# Patient Record
Sex: MALE | Race: WHITE | NOT HISPANIC OR LATINO | Employment: OTHER | URBAN - METROPOLITAN AREA
[De-identification: names, ages, dates, MRNs, and addresses within clinical notes are randomized per-mention and may not be internally consistent; named-entity substitution may affect disease eponyms.]

---

## 2019-08-01 ENCOUNTER — HOSPITAL ENCOUNTER (OUTPATIENT)
Dept: OTHER | Facility: HOSPITAL | Age: 64
Discharge: HOME | End: 2019-08-01
Attending: SPECIALIST

## 2019-08-01 LAB
ALBUMIN SERPL-MCNC: 4.3 G/DL (ref 3.5–5)
ALBUMIN/GLOB SERPL: 1.4 {RATIO} (ref 1.4–2.6)
ALP SERPL-CCNC: 87 U/L (ref 56–155)
ALT SERPL-CCNC: 17 U/L (ref 10–40)
ANION GAP SERPL CALC-SCNC: 17 MMOL/L (ref 8–19)
AST SERPL-CCNC: 17 U/L (ref 15–50)
BILIRUB SERPL-MCNC: 0.87 MG/DL (ref 0.2–1.3)
BUN SERPL-MCNC: 13 MG/DL (ref 5–25)
BUN/CREAT SERPL: 15 {RATIO} (ref 6–20)
CALCIUM SERPL-MCNC: 9.7 MG/DL (ref 8.7–10.4)
CHLORIDE SERPL-SCNC: 100 MMOL/L (ref 99–111)
CHOLEST SERPL-MCNC: 107 MG/DL (ref 107–200)
CHOLEST/HDLC SERPL: 3.1 {RATIO} (ref 3–6)
CONV CO2: 27 MMOL/L (ref 22–32)
CONV TOTAL PROTEIN: 7.3 G/DL (ref 6.3–8.2)
CREAT UR-MCNC: 0.89 MG/DL (ref 0.7–1.2)
GFR SERPLBLD BASED ON 1.73 SQ M-ARVRAT: >60 ML/MIN/{1.73_M2}
GLOBULIN UR ELPH-MCNC: 3 G/DL (ref 2–3.5)
GLUCOSE SERPL-MCNC: 86 MG/DL (ref 70–99)
HDLC SERPL-MCNC: 35 MG/DL (ref 40–60)
LDLC SERPL CALC-MCNC: 53 MG/DL (ref 70–100)
OSMOLALITY SERPL CALC.SUM OF ELEC: 289 MOSM/KG (ref 273–304)
POTASSIUM SERPL-SCNC: 4 MMOL/L (ref 3.5–5.3)
PSA SERPL-MCNC: 3.84 NG/ML (ref 0–4)
SODIUM SERPL-SCNC: 140 MMOL/L (ref 135–147)
TRIGL SERPL-MCNC: 97 MG/DL (ref 40–150)
VLDLC SERPL-MCNC: 19 MG/DL (ref 5–37)

## 2020-07-30 ENCOUNTER — HOSPITAL ENCOUNTER (OUTPATIENT)
Dept: OTHER | Facility: HOSPITAL | Age: 65
Discharge: HOME | End: 2020-07-30
Attending: SPECIALIST

## 2020-07-30 LAB
ALBUMIN SERPL-MCNC: 4.1 G/DL (ref 3.5–5)
ALBUMIN/GLOB SERPL: 1.3 {RATIO} (ref 1.4–2.6)
ALP SERPL-CCNC: 82 U/L (ref 56–155)
ALT SERPL-CCNC: 16 U/L (ref 10–40)
ANION GAP SERPL CALC-SCNC: 20 MMOL/L (ref 8–19)
AST SERPL-CCNC: 17 U/L (ref 15–50)
BILIRUB SERPL-MCNC: 0.72 MG/DL (ref 0.2–1.3)
BUN SERPL-MCNC: 9 MG/DL (ref 5–25)
BUN/CREAT SERPL: 10 {RATIO} (ref 6–20)
CALCIUM SERPL-MCNC: 9.9 MG/DL (ref 8.7–10.4)
CHLORIDE SERPL-SCNC: 102 MMOL/L (ref 99–111)
CHOLEST SERPL-MCNC: 118 MG/DL (ref 107–200)
CHOLEST/HDLC SERPL: 3.8 {RATIO} (ref 3–6)
CONV CO2: 25 MMOL/L (ref 22–32)
CONV TOTAL PROTEIN: 7.2 G/DL (ref 6.3–8.2)
CREAT UR-MCNC: 0.91 MG/DL (ref 0.7–1.2)
GFR SERPLBLD BASED ON 1.73 SQ M-ARVRAT: >60 ML/MIN/{1.73_M2}
GLOBULIN UR ELPH-MCNC: 3.1 G/DL (ref 2–3.5)
GLUCOSE SERPL-MCNC: 105 MG/DL (ref 70–99)
HDLC SERPL-MCNC: 31 MG/DL (ref 40–60)
LDLC SERPL CALC-MCNC: 59 MG/DL (ref 70–100)
OSMOLALITY SERPL CALC.SUM OF ELEC: 295 MOSM/KG (ref 273–304)
POTASSIUM SERPL-SCNC: 3.5 MMOL/L (ref 3.5–5.3)
PSA SERPL-MCNC: 5.43 NG/ML (ref 0–4)
SODIUM SERPL-SCNC: 143 MMOL/L (ref 135–147)
TRIGL SERPL-MCNC: 140 MG/DL (ref 40–150)
VLDLC SERPL-MCNC: 28 MG/DL (ref 5–37)

## 2020-11-12 ENCOUNTER — OFFICE VISIT CONVERTED (OUTPATIENT)
Dept: UROLOGY | Facility: CLINIC | Age: 65
End: 2020-11-12
Attending: NURSE PRACTITIONER

## 2020-11-17 ENCOUNTER — HOSPITAL ENCOUNTER (OUTPATIENT)
Dept: LAB | Facility: HOSPITAL | Age: 65
Discharge: HOME | End: 2020-11-17
Attending: PHYSICIAN ASSISTANT

## 2020-11-17 LAB
25(OH)D3 SERPL-MCNC: 23.3 NG/ML (ref 30–100)
ALBUMIN SERPL-MCNC: 4.2 G/DL (ref 3.5–5)
ALBUMIN/GLOB SERPL: 1.4 {RATIO} (ref 1.4–2.6)
ALP SERPL-CCNC: 91 U/L (ref 56–155)
ALT SERPL-CCNC: 17 U/L (ref 10–40)
ANION GAP SERPL CALC-SCNC: 16 MMOL/L (ref 8–19)
AST SERPL-CCNC: 20 U/L (ref 15–50)
BASOPHILS # BLD AUTO: 0.07 10*3/UL (ref 0–0.2)
BASOPHILS NFR BLD AUTO: 0.9 % (ref 0–3)
BILIRUB SERPL-MCNC: 0.61 MG/DL (ref 0.2–1.3)
BUN SERPL-MCNC: 10 MG/DL (ref 5–25)
BUN/CREAT SERPL: 11 {RATIO} (ref 6–20)
CALCIUM SERPL-MCNC: 9.4 MG/DL (ref 8.7–10.4)
CHLORIDE SERPL-SCNC: 103 MMOL/L (ref 99–111)
CHOLEST SERPL-MCNC: 110 MG/DL (ref 107–200)
CHOLEST/HDLC SERPL: 2.9 {RATIO} (ref 3–6)
CONV ABS IMM GRAN: 0.02 10*3/UL (ref 0–0.2)
CONV CO2: 27 MMOL/L (ref 22–32)
CONV IMMATURE GRAN: 0.3 % (ref 0–1.8)
CONV TOTAL PROTEIN: 7.2 G/DL (ref 6.3–8.2)
CREAT UR-MCNC: 0.95 MG/DL (ref 0.7–1.2)
DEPRECATED RDW RBC AUTO: 38.7 FL (ref 35.1–43.9)
EOSINOPHIL # BLD AUTO: 0.32 10*3/UL (ref 0–0.7)
EOSINOPHIL # BLD AUTO: 4 % (ref 0–7)
ERYTHROCYTE [DISTWIDTH] IN BLOOD BY AUTOMATED COUNT: 12.1 % (ref 11.6–14.4)
EST. AVERAGE GLUCOSE BLD GHB EST-MCNC: 126 MG/DL
GFR SERPLBLD BASED ON 1.73 SQ M-ARVRAT: >60 ML/MIN/{1.73_M2}
GLOBULIN UR ELPH-MCNC: 3 G/DL (ref 2–3.5)
GLUCOSE SERPL-MCNC: 96 MG/DL (ref 70–99)
HBA1C MFR BLD: 6 % (ref 3.5–5.7)
HCT VFR BLD AUTO: 44.3 % (ref 42–52)
HDLC SERPL-MCNC: 38 MG/DL (ref 40–60)
HGB BLD-MCNC: 15.2 G/DL (ref 14–18)
LDLC SERPL CALC-MCNC: 58 MG/DL (ref 70–100)
LYMPHOCYTES # BLD AUTO: 2.93 10*3/UL (ref 1–5)
LYMPHOCYTES NFR BLD AUTO: 36.6 % (ref 20–45)
MCH RBC QN AUTO: 29.7 PG (ref 27–31)
MCHC RBC AUTO-ENTMCNC: 34.3 G/DL (ref 33–37)
MCV RBC AUTO: 86.7 FL (ref 80–96)
MONOCYTES # BLD AUTO: 0.51 10*3/UL (ref 0.2–1.2)
MONOCYTES NFR BLD AUTO: 6.4 % (ref 3–10)
NEUTROPHILS # BLD AUTO: 4.15 10*3/UL (ref 2–8)
NEUTROPHILS NFR BLD AUTO: 51.8 % (ref 30–85)
NRBC CBCN: 0 % (ref 0–0.7)
OSMOLALITY SERPL CALC.SUM OF ELEC: 293 MOSM/KG (ref 273–304)
PLATELET # BLD AUTO: 247 10*3/UL (ref 130–400)
PMV BLD AUTO: 11 FL (ref 9.4–12.4)
POTASSIUM SERPL-SCNC: 4.3 MMOL/L (ref 3.5–5.3)
RBC # BLD AUTO: 5.11 10*6/UL (ref 4.7–6.1)
SODIUM SERPL-SCNC: 142 MMOL/L (ref 135–147)
T4 FREE SERPL-MCNC: 1.2 NG/DL (ref 0.9–1.8)
TRIGL SERPL-MCNC: 72 MG/DL (ref 40–150)
TSH SERPL-ACNC: 2.48 M[IU]/L (ref 0.27–4.2)
VLDLC SERPL-MCNC: 14 MG/DL (ref 5–37)
WBC # BLD AUTO: 8 10*3/UL (ref 4.8–10.8)

## 2021-01-29 ENCOUNTER — HOSPITAL ENCOUNTER (OUTPATIENT)
Dept: OTHER | Facility: HOSPITAL | Age: 66
Discharge: HOME | End: 2021-01-29
Attending: NURSE PRACTITIONER

## 2021-01-29 LAB — PSA SERPL-MCNC: 4.61 NG/ML (ref 0–4)

## 2021-02-26 ENCOUNTER — CONVERSION ENCOUNTER (OUTPATIENT)
Dept: SURGERY | Facility: CLINIC | Age: 66
End: 2021-02-26

## 2021-02-26 ENCOUNTER — OFFICE VISIT CONVERTED (OUTPATIENT)
Dept: UROLOGY | Facility: CLINIC | Age: 66
End: 2021-02-26
Attending: NURSE PRACTITIONER

## 2021-02-26 LAB
BILIRUB UR QL STRIP: NEGATIVE
COLOR UR: YELLOW
CONV BACTERIA IN URINE MICRO: 0
CONV CALCIUM OXALATE CRYSTALS /HPF IN URINE SEDIMENT BY MICROSCOPY: 0
CONV CLARITY OF URINE: CLEAR
CONV PROTEIN IN URINE BY AUTOMATED TEST STRIP: NEGATIVE
CONV UROBILINOGEN IN URINE BY AUTOMATED TEST STRIP: NORMAL
GLUCOSE UR QL: NEGATIVE
HGB UR QL STRIP: NEGATIVE
KETONES UR QL STRIP: NEGATIVE
LEUKOCYTE ESTERASE UR QL STRIP: NORMAL
NITRITE UR QL STRIP: NEGATIVE
PH UR STRIP.AUTO: 7 [PH]
RBC #/AREA URNS HPF: 0 /[HPF]
RENAL EPI CELLS #/AREA URNS HPF: 0 /[HPF]
SP GR UR: 1.02
SQUAMOUS SPT QL MICRO: 0
WBC #/AREA URNS HPF: 0 /[HPF]

## 2021-03-02 ENCOUNTER — OFFICE VISIT CONVERTED (OUTPATIENT)
Dept: SURGERY | Facility: CLINIC | Age: 66
End: 2021-03-02
Attending: NURSE PRACTITIONER

## 2021-03-02 ENCOUNTER — CONVERSION ENCOUNTER (OUTPATIENT)
Dept: SURGERY | Facility: CLINIC | Age: 66
End: 2021-03-02

## 2021-03-10 ENCOUNTER — HOSPITAL ENCOUNTER (OUTPATIENT)
Dept: VACCINE CLINIC | Facility: HOSPITAL | Age: 66
Discharge: HOME | End: 2021-03-10
Attending: INTERNAL MEDICINE

## 2021-03-31 ENCOUNTER — HOSPITAL ENCOUNTER (OUTPATIENT)
Dept: VACCINE CLINIC | Facility: HOSPITAL | Age: 66
Discharge: HOME | End: 2021-03-31
Attending: INTERNAL MEDICINE

## 2021-04-01 ENCOUNTER — HOSPITAL ENCOUNTER (OUTPATIENT)
Dept: GASTROENTEROLOGY | Facility: HOSPITAL | Age: 66
Setting detail: HOSPITAL OUTPATIENT SURGERY
Discharge: HOME | End: 2021-04-01
Attending: SURGERY

## 2021-04-15 ENCOUNTER — OFFICE VISIT CONVERTED (OUTPATIENT)
Dept: SURGERY | Facility: CLINIC | Age: 66
End: 2021-04-15
Attending: SURGERY

## 2021-05-10 NOTE — H&P
History and Physical      Patient Name: David Scott   Patient ID: [id number]   Sex: Male   YOB: 1955    Referring Provider: Neo Kat MD    Visit Date: November 12, 2020    Provider: NORA Mares   Location: [facility]   Location Address: [address] [place] [address]  [id number]   Location Phone: [phone]          Chief Complaint  · Elevated PSA      History Of Present Illness  The patient is a 65 year old /White male, who presents on referral from Neo Kat MD, for an urological evaluation for an elevated PSA. The PSA was noted as elevated approximately 3 months ago and stated to be at a level of 5.4 . There has not been a repeat PSA since the last elevated one on 07/30/2020   The patient also reports frequency, hesitancy, slowing of his stream, nocturia, 1-2 times a night, and post void dribbling. Additionally, he denies burning, chills, fever, and hematuria.   The patient is currently not taking any Urology specific medications.   The patient's past medical history is non-contributory.   Petinent studies:  To date, no diagnostic studies have been performed. He was evaluated by Garcia Fox MD 3 years ago for the condition of elevated PSA.      Was previously seen in 2017 for a mildly elevated PSA level that had gone down by the time he had presented to the urology provider.  He was recommended to have annual follow-ups with urology however, has not been seen since his last visit in 2017.    1 brother with prostates cancer    No other family Hx of  cancers.    PSA trend:   7/20     5.43  8/19     3.84  6/18     3.78  10/16   3.43  3/16     4.52  10/14    2.7           Past Medical History  Disease Name Date Onset Notes   Allergic rhinitis, chronic --  --    High blood pressure --  --    Personal history of colonic polyps 01/15/2016 --    Screening for colon cancer 01/15/2016 --          Past Surgical History  Procedure Name Date Notes    Colonoscopy --  --          Medication List  Name Date Started Instructions   Eliquis 5 mg oral tablet 01/07/2016 --    losartan-hydrochlorothiazide 100-12.5 mg oral tablet  take 1 tablet by oral route once daily   metoprolol tartrate 50 mg oral tablet 12/22/2015 --    pravastatin 10 mg oral tablet  take 1 tablet (10 mg) by oral route once daily at bedtime         Allergy List  Allergen Name Date Reaction Notes   NO KNOWN DRUG ALLERGIES --  --  --          Family Medical History  Disease Name Relative/Age Notes   Renal Calculus Brother/   Brother         Social History  Finding Status Start/Stop Quantity Notes   Alcohol Never 0/0 --  drinks no   Caffeine Current every day 0/0 --  drinks regularly; coffee, tea and soft drinks; 3-4 times per day   Second hand smoke exposure Never 0/0 --  no   Tobacco Never 0/0 --  never a smoker         Review of Systems  · Constitutional  o Denies  o : fever, chills  · HENT  o Denies  o : sore throat, nasal congestion, nasal discharge, sinus pain, headaches  · Cardiovascular  o Denies  o : chest pain, cardiac murmurs, irregular heart beats, dyspnea on exertion  · Respiratory  o Denies  o : shortness of breath, wheezing  · Gastrointestinal  o Denies  o : nausea, vomiting, change in abdominal girth, diarrhea, constipation, blood in stools  · Genitourinary  o Denies  o : additional symptoms, except as noted in HPI  · Integument  o Denies  o : rash, itching, new skin lesions  · Neurologic  o Denies  o : tingling or numbness, incoordination, seizures  · Endocrine  o Denies  o : polyuria, polydipsia, cold intolerance, heat intolerance, weight gain, weight loss  · Psychiatric  o Denies  o : anxiety, depression      Vitals  Date Time BP Position Site L\R Cuff Size HR RR TEMP (F) WT  HT  BMI kg/m2 BSA m2 O2 Sat FR L/min FiO2 HC       11/12/2020 11:18 AM       15  261lbs 4oz 6'   35.43 2.45             Physical Examination  · Constitutional  o Appearance  o : Well nourished, well developed  patient in no acute distress. Ambulating without difficulty.  · Head and Face  o Head  o :   § Inspection  § : atraumatic, normocephalic  o Face  o :   § Inspection  § : no facial lesions  · Eyes  o Sclerae  o : sclerae white  · Ears, Nose, Mouth and Throat  o Ears  o :   § External Ears  § : appearance within normal limits, no lesions present  o Nose  o :   § External Nose  § : appearance normal  · Neck  o Inspection/Palpation  o : normal appearance, trachea midline  o Thyroid  o : Normal size without tenderness, nodules or masses  · Respiratory  o Respiratory Effort  o : Breathing is unlabored without accessory muscle use  o Inspection of Chest  o : normal appearance, no retractions  o Auscultation of Lungs  o : Normal breath sounds  · Genitourinary  o Penis  o : Normal appearance without lesion, discharge or masses.  o Digital Rectal Examination  o :   § Tone and Masses  § : normal sphincter tone, no rectal masses present  § Prostate  § : nontender to palpation, mildly enlarged, no nodules present, consistency normal  § Seminal Vesicles  § : normal size and symmetry without masses or tenderness  · Skin and Subcutaneous Tissue  o General Inspection  o : No rashes, lesions or areas of discoloration present. Skin turgor is normal.  o General Palpation  o : No abnormalities, masses or tenderness on palpation.  · Neurologic  o Mental Status Examination  o :   § Orientation  § : grossly oriented to person, place and time  § Speech/Language  § : communication ability within normal limits  o Gait and Station  o : normal gait, able to stand without difficulty  · Psychiatric  o Judgement and Insight  o : judgment and insight intact, judgement for everyday activities and social situations within normal limits, insight intact  o Mood and Affect  o : mood normal, affect appropriate          Results  · In-Office Procedures  o Lab procedure  § Automated dipstick urinalysis with microscopy (07293)   § Color Ur: Yellow    § Clarity Ur: Clear   § Glucose Ur Ql Strip: Negative   § Bilirub Ur Ql Strip: Negative   § Ketones Ur Ql Strip: Negative   § Sp Gr Ur Qn: 1.025   § Hgb Ur Ql Strip: Negative   § pH Ur-LsCnc: 7.0   § Prot Ur Ql Strip: Negative   § Urobilinogen Ur Strip-mCnc: 0.2   § Nitrite Ur Ql Strip: Negative   § WBC Est Ur Ql Strip: Negative   § RBC UrnS Qn HPF: 00   § WBC UrnS Qn HPF: 0   § Bacteria UrnS Qn HPF: 0   § Crystals UrnS Qn HPF: 0   § Epithelial Cells (non renal): 0 /HPF  § Epithelial Cells (renal): 0   o Surgical procedure  § IOP - Bladder Scan/Residual Urine (25138)   § Specimen vol Ur: 12       Assessment  · Elevated prostate specific antigen (PSA)     790.93/R97.20  · Lower urinary tract symptoms     788.99/R39.9      Plan  · Orders  o PSA ultrasensitive DIAGNOSTIC [facility] (25298) - 790.93/R97.20, 788.99/R39.9 - 02/12/2021  · Medications  o Florajen3 460 mg (7.5-6- 1.5 bill. cell) oral capsule   SIG: take 1 capsule by oral route 2 times a day for 28 days   DISP: (56) Capsule with 0 refills  Prescribed on 11/12/2020     o doxycycline hyclate 100 mg oral capsule   SIG: take 1 capsule (100 mg) by oral route 2 times per day for 28 days   DISP: (56) Capsule with 0 refills  Prescribed on 11/12/2020     o tamsulosin 0.4 mg oral capsule   SIG: take 1 capsule (0.4 mg) by oral route once daily 1/2 hour following the same meal each day for 30 days   DISP: (30) Capsule with 11 refills  Prescribed on 11/12/2020     · Instructions  o DISCUSSION AND PLAN:  o The patient's PSA is elevated on initial exam. I have discussed the causes of an elevated PSA. I have made recommendations and I have the patient return in follow-up for a recheck of the PSA.  o Will treat for any infectious cause of elevated PSA and Repeat PSA in 3 months             Electronically Signed by: NORA Mares -Author on November 12, 2020 12:04:13 PM

## 2021-05-10 NOTE — H&P
History and Physical      Patient Name: David Scott   Patient ID: [id number]   Sex: Male   YOB: 1955    Primary Care Provider: Ceci Costa PA-C   Referring Provider: Neo Kat MD    Visit Date: March 2, 2021    Provider: NORA Giraldo   Location: [facility]   Location Address: [address] [place] [address]  [id number]   Location Phone: [phone]          Chief Complaint  · Requesting colonoscopy  · Age 50 or over  · Here today for a pre-surgical colon screening visit  · Personal History of Polyps      History Of Present Illness  The patient is a 65 year old /White male presenting to the Surgical Specialist office on a referral from Neo Kat MD.   David Scott needs to have a screening colonoscopy.   Patient states that they have had a colonoscopy. 5 years ago   Patient currently complains of: no complaints   Patient Does not have family history of colon cancer.      Presents today on referral from Dr. Neo Kat for screening colonoscopy.  Patient denies any abdominal pain.  Change in bowel habit, or rectal bleeding.  Denies any family history of colorectal cancer.  Admits to history of colonic polyps.      Patient reports that he takes Eliquis daily for A. fib and is under the care of Dr. Kat.  I will get cardiac clearance prior to the procedure.    2/16: Colonoscopy ([facility]): Hepatic Flexure - tubular adenoma; Cecum polyp not retrieved.       Past Medical History  Disease Name Date Onset Notes   Allergic rhinitis, chronic --  --    High blood pressure --  --    Personal history of colonic polyps 01/15/2016 --    Screening for colon cancer 01/15/2016 --          Past Surgical History  Procedure Name Date Notes   Colonoscopy --  --          Medication List  Name Date Started Instructions   Eliquis 5 mg oral tablet 01/07/2016 --    losartan-hydrochlorothiazide 100-12.5 mg oral tablet  take 1 tablet by oral route once daily   metoprolol  tartrate 50 mg oral tablet 12/22/2015 --    pravastatin 10 mg oral tablet  take 1 tablet (10 mg) by oral route once daily at bedtime   Suprep Bowel Prep Kit 17.5-3.13-1.6 gram oral recon soln 03/02/2021 take as directed   tamsulosin 0.4 mg oral capsule 11/12/2020 take 1 capsule (0.4 mg) by oral route once daily 1/2 hour following the same meal each day for 30 days         Allergy List  Allergen Name Date Reaction Notes   NO KNOWN DRUG ALLERGIES --  --  --        Allergies Reconciled  Family Medical History  Disease Name Relative/Age Notes   Prostate cancer Brother/   Brother   Renal Calculus Brother/   Brother         Social History  Finding Status Start/Stop Quantity Notes   Alcohol Current some day --/-- --  Light drinker   Caffeine Current every day 0/0 --  drinks regularly; coffee, tea and soft drinks; 3-4 times per day   Second hand smoke exposure Never 0/0 --  no   Tobacco Never 0/0 --  never a smoker         Review of Systems  · Constitutional  o Denies  o : fever, chills  · Eyes  o Denies  o : yellowish discoloration of eyes  · HENT  o Denies  o : difficulty swallowing  · Cardiovascular  o Denies  o : chest pain, chest pain on exertion  · Respiratory  o Denies  o : shortness of breath  · Gastrointestinal  o Denies  o : nausea, vomiting, diarrhea, constipation  · Genitourinary  o Denies  o : abnormal color of urine  · Integument  o Denies  o : rash  · Neurologic  o Denies  o : tingling or numbness  · Musculoskeletal  o Denies  o : joint pain  · Endocrine  o Denies  o : weight gain, weight loss      Vitals  Date Time BP Position Site L\R Cuff Size HR RR TEMP (F) WT  HT  BMI kg/m2 BSA m2 O2 Sat FR L/min FiO2 HC       03/02/2021 09:09 AM       16  267lbs 0oz 6'   36.21 2.48             Physical Examination  · Constitutional  o Appearance  o : well developed, well-nourished, patient in no apparent distress  · Head and Face  o Head  o :   § Inspection  § : atraumatic, normocephalic  o Face  o :   § Inspection  § :  no facial lesions  · Eyes  o Conjunctivae  o : conjunctivae normal  o Sclerae  o : sclerae white  · Neck  o Inspection/Palpation  o : normal appearance, no masses or tenderness, trachea midline  · Respiratory  o Respiratory Effort  o : breathing unlabored  · Skin and Subcutaneous Tissue  o General Inspection  o : no lesions present, no areas of discoloration, skin turgor normal, texture normal  · Neurologic  o Mental Status Examination  o :   § Orientation  § : grossly oriented to person, place and time  § Attention  § : attention normal, concentration abilities normal  § Fund of Knowledge  § : fund of knowledge within normal limits, patient aware of current events  o Gait and Station  o : normal gait, able to stand without difficulty  · Psychiatric  o Judgement and Insight  o : judgment and insight intact  o Mood and Affect  o : mood normal, affect appropriate              Assessment  · Personal history of colonic polyps     V12.72/Z86.010  · Screening for colon cancer     V76.51/Z12.11    Problems Reconciled  Plan  · Orders  o Consent for Colonoscopy Screening-Possible risk/complications, benefits, and alternatives to surgical or invasive procedure have been explained to patient and/or legal guardian. -Patient has been evaluated and can tolerate anesthesia and/or sedation. Risks, benefits, and alternatives to anesthesia and sedation have been explained to patient and/or legal guardian. () - V76.51/Z12.11, V12.72/Z86.010 - 04/01/2021  · Medications  o Medications have been Reconciled  o Transition of Care or Provider Policy  · Instructions  o Surgical [facility]  o Handouts Provided Pre-Procedure Instructions including date, time, and location of procedure.   o PLAN: Proceeed with colonoscopy. Patient understands risks/benefits and is willing to proceed.   o ***Surgical Orders***  o RISK AND BENEFITS:  o Given these options, the patient has verbally expressed an understanding of the  risks of the surgery and finds these risks acceptable. Will proceed with surgery as soon as possible.  o O.R. PREP: Per protocol   o IV: Per Anesthesia  o Please sign permit for: Colonoscopy with possible biopsies by Dr. Brown.  o The above History and Physical Examination has been completed within 30 days of admission.  o ***Patient Status***  o Outpatient  o Follow up in the in the office post procedure.  o I have instructed the patient to hold his Eliquis for 24 hours prior to the procedure or until cardiac clearance.  o Electronically Identified Patient Education Materials Provided Electronically  · Disposition  o Call or Return if symptoms worsen or persist.            Electronically Signed by: NORA Giraldo -Author on March 2, 2021 09:32:17 AM

## 2021-05-14 VITALS — BODY MASS INDEX: 36.16 KG/M2 | RESPIRATION RATE: 16 BRPM | WEIGHT: 267 LBS | HEIGHT: 72 IN

## 2021-05-14 VITALS — HEIGHT: 72 IN | BODY MASS INDEX: 36.04 KG/M2 | WEIGHT: 266.12 LBS

## 2021-05-14 VITALS — BODY MASS INDEX: 35.38 KG/M2 | WEIGHT: 261.25 LBS | HEIGHT: 72 IN | RESPIRATION RATE: 15 BRPM

## 2021-05-14 NOTE — PROGRESS NOTES
Progress Note      Patient Name: David Scott   Patient ID: [id number]   Sex: Male   YOB: 1955    Primary Care Provider: Ceci Costa PA-C   Referring Provider: Neo Kat MD    Visit Date: April 15, 2021    Provider: Ricardo Brown MD   Location: [facility]   Location Address: [address] [place] [address]  [id number]   Location Phone: [phone]          Chief Complaint  · Follow Up Surgery      History Of Present Illness  David Scott is a 65 year old /White male who presents today for a postoperative visit. He follows-up status post colonoscopy. The patient has done well after his procedure. He is not reporting any unexpected signs or symptoms. He is eating and drinking normally and having regular bowel movements.       Past Medical History  Allergic rhinitis, chronic; High blood pressure; Personal history of colonic polyps; Screening for colon cancer         Past Surgical History  Colonoscopy         Medication List  Eliquis 5 mg oral tablet; losartan-hydrochlorothiazide 100-12.5 mg oral tablet; metoprolol tartrate 50 mg oral tablet; pravastatin 10 mg oral tablet; tamsulosin 0.4 mg oral capsule         Allergy List  NO KNOWN DRUG ALLERGIES         Family Medical History  Prostate cancer; Renal Calculus         Social History  Alcohol (Current some day); Caffeine (Current every day); Second hand smoke exposure (Never); Tobacco (Never)         Review of Systems  · Cardiovascular  o Denies  o : chest pain on exertion, shortness of breath, lower extremity swelling  · Respiratory  o Denies  o : shortness of breath, coughing up blood  · Gastrointestinal  o Denies  o : chronic abdominal pain      Vitals  Date Time BP Position Site L\R Cuff Size HR RR TEMP (F) WT  HT  BMI kg/m2 BSA m2 O2 Sat FR L/min FiO2 HC       04/15/2021 09:11 AM         266lbs 2oz 6'   36.09 2.48             Physical Examination  · Constitutional  o Appearance  o : well developed,  well-nourished, alert and in no acute distress  · Head and Face  o Head  o :   § Inspection  § : no deformities or lesions  · Eyes  o Conjunctivae  o : clear  o Sclerae  o : nonicteric  · Neck  o Inspection/Palpation  o : normal appearance, no masses or tenderness, trachea midline  · Respiratory  o Respiratory Effort  o : breathing unlabored  o Inspection of Chest  o : normal appearance, no retractions  · Cardiovascular  o Heart  o : regular rate and rhythm  · Gastrointestinal  o Abdominal Examination  o :   § Abdomen  § : abdomen is soft   · Lymphatic  o Neck  o : no lymphadenopathy present  o Axilla  o : no lymphadenopathy present  o Groin  o : no lymphadenopathy present  · Skin and Subcutaneous Tissue  o General Inspection  o : no rashes present, no lesions present, no areas of discoloration  · Neurologic  o Cranial Nerves  o : grossly intact  o Sensation  o : grossly intact  o Gait and Station  o :   § Gait Screening  § : normal gait, able to stand without diffculty  o Cerebellar Function  o : no obvious abnormalities  · Psychiatric  o Judgement and Insight  o : judgment and insight intact  o Mood and Affect  o : mood normal, affect appropriate          Assessment  · Encounter for examination following surgery     V67.00/Z09       65-year-old male who is following up after a colonoscopy. His colonoscopy showed a couple of polyps, one which was an adenomatous polyp and one that was a hyperplastic polyp. He also had some external hemorrhoids.       Plan  · Medications  o Medications have been Reconciled  o Transition of Care or Provider Policy  · Instructions  o Electronically Identified Patient Education Materials Provided Electronically     In regards to the hemorrhoids, we discussed lifestyle changes, dietary changes, and things to watch out for with the hemorrhoids. Currently, they are asymptomatic so I have recommended no additional therapy at this time. In regards to the polyps, secondary to the adenomatous  polyps and history of adenomatous polyps, I would recommend a repeat screening colonoscopy in five years.     I discussed all of this with the patient. All questions were answered.  He voiced understanding and agreed to proceed with the above plan.             Electronically Signed by: Yarelis Mendiola-, -Author on April 19, 2021 02:26:06 PM  Electronically Co-signed by: Ricardo Brown MD -Reviewer on April 19, 2021 10:33:15 PM

## 2021-05-14 NOTE — PROGRESS NOTES
Progress Note      Patient Name: David Scott   Patient ID: [id number]   Sex: Male   YOB: 1955    Primary Care Provider: Ceci Costa PA-C   Referring Provider: Neo Kat MD    Visit Date: February 26, 2021    Provider: NORA Mares   Location: [facility]   Location Address: [address] [place] [address]  [id number]   Location Phone: [phone]          Chief Complaint  · Elevated PSA      History Of Present Illness  The patient is a 65 year old /White male , who presents for f/u on elevated PSA for an elevated PSA. .      Took all 28 days of doxycycline.    States flow has improved with Tamsulosin 0.4mg q day.    Repeat PSA was 4.6    Previous:  Was previously seen in 2017 for a mildly elevated PSA level that had gone down by the time he had presented to the urology provider.  He was recommended to have annual follow-ups with urology however, has not been seen since his last visit in 2017.    1 brother with prostates cancer    No other family Hx of  cancers.    PSA trend:   7/20     5.43  8/19     3.84  6/18     3.78  10/16   3.43  3/16     4.52  10/14    2.7           Past Medical History  Allergic rhinitis, chronic; High blood pressure; Personal history of colonic polyps; Screening for colon cancer         Past Surgical History  Colonoscopy         Medication List  Eliquis 5 mg oral tablet; losartan-hydrochlorothiazide 100-12.5 mg oral tablet; metoprolol tartrate 50 mg oral tablet; pravastatin 10 mg oral tablet; tamsulosin 0.4 mg oral capsule         Allergy List  NO KNOWN DRUG ALLERGIES       Allergies Reconciled  Family Medical History  Renal Calculus         Social History  Alcohol (Never); Caffeine (Current every day); Second hand smoke exposure (Never); Tobacco (Never)         Review of Systems  · Constitutional  o Denies  o : fever, chills  · HENT  o Denies  o : sore throat, nasal congestion, nasal discharge, sinus pain,  headaches  · Cardiovascular  o Denies  o : chest pain, cardiac murmurs, irregular heart beats, dyspnea on exertion  · Respiratory  o Denies  o : shortness of breath, wheezing  · Gastrointestinal  o Denies  o : nausea, vomiting, change in abdominal girth, diarrhea, constipation, blood in stools  · Genitourinary  o Denies  o : additional symptoms, except as noted in HPI  · Integument  o Denies  o : rash, itching, new skin lesions  · Neurologic  o Denies  o : tingling or numbness, incoordination, seizures  · Endocrine  o Denies  o : polyuria, polydipsia, cold intolerance, heat intolerance, weight gain, weight loss  · Psychiatric  o Denies  o : anxiety, depression      Physical Examination  · Constitutional  o Appearance  o : Well nourished, well developed patient in no acute distress. Ambulating without difficulty.  · Head and Face  o Head  o :   § Inspection  § : atraumatic, normocephalic  o Face  o :   § Inspection  § : no facial lesions  · Eyes  o Sclerae  o : sclerae white  · Ears, Nose, Mouth and Throat  o Ears  o :   § External Ears  § : appearance within normal limits, no lesions present  o Nose  o :   § External Nose  § : appearance normal  · Neck  o Inspection/Palpation  o : normal appearance, trachea midline  · Respiratory  o Respiratory Effort  o : Breathing is unlabored without accessory muscle use  o Inspection of Chest  o : normal appearance, no retractions  · Skin and Subcutaneous Tissue  o General Inspection  o : No rashes, lesions or areas of discoloration present. Skin turgor is normal.  o General Palpation  o : No abnormalities, masses or tenderness on palpation.  · Neurologic  o Mental Status Examination  o :   § Orientation  § : grossly oriented to person, place and time  § Speech/Language  § : communication ability within normal limits  o Gait and Station  o : normal gait, able to stand without difficulty  · Psychiatric  o Judgement and Insight  o : judgment and insight intact, judgement for  everyday activities and social situations within normal limits, insight intact  o Mood and Affect  o : mood normal, affect appropriate          Results  · In-Office Procedures  o Lab procedure  § Automated dipstick urinalysis with microscopy ([id number])   § Color Ur: Yellow   § Clarity Ur: Clear   § Glucose Ur Ql Strip: Negative   § Bilirub Ur Ql Strip: Negative   § Ketones Ur Ql Strip: Negative   § Sp Gr Ur Qn: 1.025   § Hgb Ur Ql Strip: Negative   § pH Ur-LsCnc: 7.0   § Prot Ur Ql Strip: Negative   § Urobilinogen Ur Strip-mCnc: 1.0 E.U./dL   § Nitrite Ur Ql Strip: Negative   § WBC Est Ur Ql Strip: Trace   § RBC UrnS Qn HPF: 0   § WBC UrnS Qn HPF: 0   § Bacteria UrnS Qn HPF: 0   § Crystals UrnS Qn HPF: 0   § Epithelial Cells (non renal): 0 /HPF  § Epithelial Cells (renal): 0       Assessment  · Elevated prostate specific antigen (PSA)     790.93/R97.20  · Lower urinary tract symptoms     788.99/R39.9      Plan  · Orders  o PSA ultrasensitive DIAGNOSTIC [facility] ([id number]) - 790.93/R97.20, 788.99/R39.9 - 08/26/2021  · Medications  o Medications have been Reconciled  o Transition of Care or Provider Policy  · Instructions  o DISCUSSION AND PLAN:  o The patient's PSA remains elevated. Again, I have discussed the causes of an elevated PSA. I have made recommendations which include continued observation. I have requested the patient return in follow-up for a recheck exam and a repeat PSA. Patient will continue tamsulosin 0.4 mg daily for control of lower urinary tract symptoms.  o Repeat PSA in 6 months.  o Electronically Identified Patient Education Materials Provided Electronically            Electronically Signed by: NORA Mares -Author on February 26, 2021 09:33:27 PM

## 2021-05-19 ENCOUNTER — HOSPITAL ENCOUNTER (OUTPATIENT)
Dept: LAB | Facility: HOSPITAL | Age: 66
Discharge: HOME | End: 2021-05-19
Attending: PHYSICIAN ASSISTANT

## 2021-05-19 LAB
25(OH)D3 SERPL-MCNC: 20.4 NG/ML (ref 30–100)
ALBUMIN SERPL-MCNC: 3.9 G/DL (ref 3.5–5)
ALBUMIN/GLOB SERPL: 1.2 {RATIO} (ref 1.4–2.6)
ALP SERPL-CCNC: 83 U/L (ref 56–155)
ALT SERPL-CCNC: 14 U/L (ref 10–40)
ANION GAP SERPL CALC-SCNC: 13 MMOL/L (ref 8–19)
AST SERPL-CCNC: 18 U/L (ref 15–50)
BASOPHILS # BLD AUTO: 0.06 10*3/UL (ref 0–0.2)
BASOPHILS NFR BLD AUTO: 0.8 % (ref 0–3)
BILIRUB SERPL-MCNC: 0.78 MG/DL (ref 0.2–1.3)
BUN SERPL-MCNC: 10 MG/DL (ref 5–25)
BUN/CREAT SERPL: 11 {RATIO} (ref 6–20)
CALCIUM SERPL-MCNC: 9 MG/DL (ref 8.7–10.4)
CHLORIDE SERPL-SCNC: 103 MMOL/L (ref 99–111)
CHOLEST SERPL-MCNC: 111 MG/DL (ref 107–200)
CHOLEST/HDLC SERPL: 3.5 {RATIO} (ref 3–6)
CONV ABS IMM GRAN: 0.01 10*3/UL (ref 0–0.2)
CONV CO2: 29 MMOL/L (ref 22–32)
CONV IMMATURE GRAN: 0.1 % (ref 0–1.8)
CONV TOTAL PROTEIN: 7.2 G/DL (ref 6.3–8.2)
CREAT UR-MCNC: 0.92 MG/DL (ref 0.7–1.2)
DEPRECATED RDW RBC AUTO: 38.4 FL (ref 35.1–43.9)
EOSINOPHIL # BLD AUTO: 0.3 10*3/UL (ref 0–0.7)
EOSINOPHIL # BLD AUTO: 4 % (ref 0–7)
ERYTHROCYTE [DISTWIDTH] IN BLOOD BY AUTOMATED COUNT: 12.4 % (ref 11.6–14.4)
EST. AVERAGE GLUCOSE BLD GHB EST-MCNC: 105 MG/DL
GFR SERPLBLD BASED ON 1.73 SQ M-ARVRAT: >60 ML/MIN/{1.73_M2}
GLOBULIN UR ELPH-MCNC: 3.3 G/DL (ref 2–3.5)
GLUCOSE SERPL-MCNC: 101 MG/DL (ref 70–99)
HBA1C MFR BLD: 5.3 % (ref 3.5–5.7)
HCT VFR BLD AUTO: 41.8 % (ref 42–52)
HDLC SERPL-MCNC: 32 MG/DL (ref 40–60)
HGB BLD-MCNC: 14.3 G/DL (ref 14–18)
LDLC SERPL CALC-MCNC: 61 MG/DL (ref 70–100)
LYMPHOCYTES # BLD AUTO: 2.23 10*3/UL (ref 1–5)
LYMPHOCYTES NFR BLD AUTO: 29.5 % (ref 20–45)
MCH RBC QN AUTO: 29.3 PG (ref 27–31)
MCHC RBC AUTO-ENTMCNC: 34.2 G/DL (ref 33–37)
MCV RBC AUTO: 85.7 FL (ref 80–96)
MONOCYTES # BLD AUTO: 0.55 10*3/UL (ref 0.2–1.2)
MONOCYTES NFR BLD AUTO: 7.3 % (ref 3–10)
NEUTROPHILS # BLD AUTO: 4.4 10*3/UL (ref 2–8)
NEUTROPHILS NFR BLD AUTO: 58.3 % (ref 30–85)
NRBC CBCN: 0 % (ref 0–0.7)
OSMOLALITY SERPL CALC.SUM OF ELEC: 291 MOSM/KG (ref 273–304)
PLATELET # BLD AUTO: 225 10*3/UL (ref 130–400)
PMV BLD AUTO: 10.7 FL (ref 9.4–12.4)
POTASSIUM SERPL-SCNC: 3.9 MMOL/L (ref 3.5–5.3)
RBC # BLD AUTO: 4.88 10*6/UL (ref 4.7–6.1)
SODIUM SERPL-SCNC: 141 MMOL/L (ref 135–147)
T4 FREE SERPL-MCNC: 1.1 NG/DL (ref 0.9–1.8)
TRIGL SERPL-MCNC: 88 MG/DL (ref 40–150)
TSH SERPL-ACNC: 3.76 M[IU]/L (ref 0.27–4.2)
VLDLC SERPL-MCNC: 18 MG/DL (ref 5–37)
WBC # BLD AUTO: 7.55 10*3/UL (ref 4.8–10.8)

## 2021-07-08 ENCOUNTER — TRANSCRIBE ORDERS (OUTPATIENT)
Dept: LAB | Facility: HOSPITAL | Age: 66
End: 2021-07-08

## 2021-07-08 ENCOUNTER — LAB (OUTPATIENT)
Dept: LAB | Facility: HOSPITAL | Age: 66
End: 2021-07-08

## 2021-07-08 DIAGNOSIS — Z12.5 SPECIAL SCREENING FOR MALIGNANT NEOPLASM OF PROSTATE: ICD-10-CM

## 2021-07-08 DIAGNOSIS — E78.5 HYPERLIPIDEMIA, UNSPECIFIED HYPERLIPIDEMIA TYPE: Primary | ICD-10-CM

## 2021-07-08 DIAGNOSIS — I10 ESSENTIAL HYPERTENSION, MALIGNANT: ICD-10-CM

## 2021-07-08 DIAGNOSIS — E78.5 HYPERLIPIDEMIA, UNSPECIFIED HYPERLIPIDEMIA TYPE: ICD-10-CM

## 2021-07-08 LAB
ALBUMIN SERPL-MCNC: 4.1 G/DL (ref 3.5–5.2)
ALBUMIN/GLOB SERPL: 1.6 G/DL
ALP SERPL-CCNC: 81 U/L (ref 39–117)
ALT SERPL W P-5'-P-CCNC: 15 U/L (ref 1–41)
ANION GAP SERPL CALCULATED.3IONS-SCNC: 8.6 MMOL/L (ref 5–15)
AST SERPL-CCNC: 13 U/L (ref 1–40)
BILIRUB SERPL-MCNC: 1.1 MG/DL (ref 0–1.2)
BUN SERPL-MCNC: 11 MG/DL (ref 8–23)
BUN/CREAT SERPL: 10.6 (ref 7–25)
CALCIUM SPEC-SCNC: 9.3 MG/DL (ref 8.6–10.5)
CHLORIDE SERPL-SCNC: 102 MMOL/L (ref 98–107)
CHOLEST SERPL-MCNC: 115 MG/DL (ref 0–200)
CO2 SERPL-SCNC: 29.4 MMOL/L (ref 22–29)
CREAT SERPL-MCNC: 1.04 MG/DL (ref 0.76–1.27)
GFR SERPL CREATININE-BSD FRML MDRD: 72 ML/MIN/1.73
GLOBULIN UR ELPH-MCNC: 2.6 GM/DL
GLUCOSE SERPL-MCNC: 91 MG/DL (ref 65–99)
HDLC SERPL-MCNC: 30 MG/DL (ref 40–60)
LDLC SERPL CALC-MCNC: 65 MG/DL (ref 0–100)
LDLC/HDLC SERPL: 2.11 {RATIO}
POTASSIUM SERPL-SCNC: 4.1 MMOL/L (ref 3.5–5.2)
PROT SERPL-MCNC: 6.7 G/DL (ref 6–8.5)
SODIUM SERPL-SCNC: 140 MMOL/L (ref 136–145)
TRIGL SERPL-MCNC: 108 MG/DL (ref 0–150)
VLDLC SERPL-MCNC: 20 MG/DL (ref 5–40)

## 2021-07-08 PROCEDURE — 36415 COLL VENOUS BLD VENIPUNCTURE: CPT

## 2021-07-08 PROCEDURE — 80061 LIPID PANEL: CPT

## 2021-07-08 PROCEDURE — 80053 COMPREHEN METABOLIC PANEL: CPT

## 2021-08-25 ENCOUNTER — TRANSCRIBE ORDERS (OUTPATIENT)
Dept: LAB | Facility: HOSPITAL | Age: 66
End: 2021-08-25

## 2021-08-25 ENCOUNTER — LAB (OUTPATIENT)
Dept: LAB | Facility: HOSPITAL | Age: 66
End: 2021-08-25

## 2021-08-25 ENCOUNTER — TELEPHONE (OUTPATIENT)
Dept: UROLOGY | Facility: CLINIC | Age: 66
End: 2021-08-25

## 2021-08-25 DIAGNOSIS — R39.9 LOWER URINARY TRACT SYMPTOMS: ICD-10-CM

## 2021-08-25 DIAGNOSIS — R97.20 ELEVATED PROSTATE SPECIFIC ANTIGEN (PSA): ICD-10-CM

## 2021-08-25 DIAGNOSIS — R97.20 ELEVATED PROSTATE SPECIFIC ANTIGEN (PSA): Primary | ICD-10-CM

## 2021-08-25 LAB — PSA SERPL-MCNC: 5.58 NG/ML (ref 0–4)

## 2021-08-25 PROCEDURE — 84153 ASSAY OF PSA TOTAL: CPT

## 2021-08-25 PROCEDURE — 36415 COLL VENOUS BLD VENIPUNCTURE: CPT

## 2021-08-25 NOTE — TELEPHONE ENCOUNTER
Spoke with pt and he stated that he would get psa drawn pt stated that the lab told him that insurance would not pay for this lab. This nurse stated that they should because his psa needs monitoring. Pt stated that he would try. sc

## 2021-08-26 ENCOUNTER — OFFICE VISIT (OUTPATIENT)
Dept: UROLOGY | Facility: CLINIC | Age: 66
End: 2021-08-26

## 2021-08-26 VITALS — WEIGHT: 258 LBS | HEIGHT: 72 IN | HEART RATE: 70 BPM | BODY MASS INDEX: 34.95 KG/M2

## 2021-08-26 DIAGNOSIS — R97.20 ELEVATED PROSTATE SPECIFIC ANTIGEN (PSA): Primary | ICD-10-CM

## 2021-08-26 DIAGNOSIS — N40.2 PROSTATE NODULE: ICD-10-CM

## 2021-08-26 PROBLEM — E78.5 HYPERLIPIDEMIA: Status: ACTIVE | Noted: 2021-08-26

## 2021-08-26 PROBLEM — J30.9 ALLERGIC RHINITIS: Status: ACTIVE | Noted: 2021-08-26

## 2021-08-26 PROBLEM — E55.9 VITAMIN D DEFICIENCY: Status: ACTIVE | Noted: 2021-08-26

## 2021-08-26 PROBLEM — I10 HIGH BLOOD PRESSURE: Status: ACTIVE | Noted: 2021-08-26

## 2021-08-26 LAB
BILIRUB BLD-MCNC: NEGATIVE MG/DL
CLARITY, POC: CLEAR
COLOR UR: YELLOW
GLUCOSE UR STRIP-MCNC: NEGATIVE MG/DL
KETONES UR QL: NEGATIVE
LEUKOCYTE EST, POC: NEGATIVE
NITRITE UR-MCNC: NEGATIVE MG/ML
PH UR: 6.5 [PH] (ref 5–8)
PROT UR STRIP-MCNC: NEGATIVE MG/DL
RBC # UR STRIP: NEGATIVE /UL
SP GR UR: 1.02 (ref 1–1.03)
UROBILINOGEN UR QL: NORMAL

## 2021-08-26 PROCEDURE — 99212 OFFICE O/P EST SF 10 MIN: CPT | Performed by: NURSE PRACTITIONER

## 2021-08-26 PROCEDURE — 81003 URINALYSIS AUTO W/O SCOPE: CPT | Performed by: NURSE PRACTITIONER

## 2021-08-26 RX ORDER — HYDROCHLOROTHIAZIDE 12.5 MG/1
CAPSULE, GELATIN COATED ORAL EVERY 24 HOURS
COMMUNITY
End: 2021-11-22 | Stop reason: SDUPTHER

## 2021-08-26 RX ORDER — METOPROLOL TARTRATE 50 MG/1
50 TABLET, FILM COATED ORAL 2 TIMES DAILY
COMMUNITY
Start: 2021-08-12

## 2021-08-26 RX ORDER — PRAVASTATIN SODIUM 40 MG
TABLET ORAL EVERY 24 HOURS
COMMUNITY

## 2021-08-26 RX ORDER — DIGOXIN 125 MCG
TABLET ORAL
COMMUNITY
End: 2021-11-22

## 2021-08-26 RX ORDER — LOSARTAN POTASSIUM AND HYDROCHLOROTHIAZIDE 12.5; 5 MG/1; MG/1
1 TABLET ORAL DAILY
COMMUNITY
Start: 2021-08-12 | End: 2021-08-26 | Stop reason: DRUGHIGH

## 2021-08-26 RX ORDER — LOSARTAN POTASSIUM AND HYDROCHLOROTHIAZIDE 12.5; 1 MG/1; MG/1
TABLET ORAL
COMMUNITY
End: 2022-03-29 | Stop reason: SDUPTHER

## 2021-08-26 RX ORDER — LOSARTAN POTASSIUM 50 MG/1
TABLET ORAL EVERY 24 HOURS
COMMUNITY
End: 2021-11-22 | Stop reason: SDUPTHER

## 2021-08-26 RX ORDER — TAMSULOSIN HYDROCHLORIDE 0.4 MG/1
CAPSULE ORAL EVERY 24 HOURS
COMMUNITY
End: 2021-11-22

## 2021-08-26 RX ORDER — APIXABAN 5 MG/1
5 TABLET, FILM COATED ORAL 2 TIMES DAILY
COMMUNITY
Start: 2021-08-12

## 2021-08-26 RX ORDER — METOPROLOL SUCCINATE 50 MG/1
TABLET, EXTENDED RELEASE ORAL EVERY 12 HOURS SCHEDULED
COMMUNITY
End: 2021-08-26 | Stop reason: SDUPTHER

## 2021-08-26 NOTE — PROGRESS NOTES
Chief Complaint: Elevated PSA    Subjective         History of Present Illness  David Scott is a 65 y.o. male presents to [facility] to be seen for f/u on elevated PSA.    His PSA previously was 4.61 and is now 5.58.    He states his LUTS have improved greatly.    Nocturia x 0-2    He denies straining to void.    No post void dribble.      No perineal pain.     Objective     Past Medical History:   Diagnosis Date   • Chronic allergic rhinitis    • High blood pressure    • Personal history of colonic polyps 01/15/2016       Past Surgical History:   Procedure Laterality Date   • COLONOSCOPY           Current Outpatient Medications:   •  apixaban (Eliquis) 5 MG tablet tablet, Every 12 (Twelve) Hours., Disp: , Rfl:   •  digoxin (LANOXIN) 125 MCG tablet, , Disp: , Rfl:   •  Eliquis 5 MG tablet tablet, Take 5 mg by mouth 2 (Two) Times a Day., Disp: , Rfl:   •  hydroCHLOROthiazide (MICROZIDE) 12.5 MG capsule, Daily., Disp: , Rfl:   •  losartan (COZAAR) 50 MG tablet, Daily., Disp: , Rfl:   •  losartan-hydrochlorothiazide (HYZAAR) 100-12.5 MG per tablet, losartan-hydrochlorothiazide 100-12.5 mg oral tablet take 1 tablet by oral route once daily   Active, Disp: , Rfl:   •  metoprolol tartrate (LOPRESSOR) 50 MG tablet, Take 50 mg by mouth 2 (Two) Times a Day., Disp: , Rfl:   •  pravastatin (PRAVACHOL) 40 MG tablet, Daily., Disp: , Rfl:   •  tamsulosin (FLOMAX) 0.4 MG capsule 24 hr capsule, Daily., Disp: , Rfl:   •  losartan-hydrochlorothiazide (HYZAAR) 50-12.5 MG per tablet, Take 1 tablet by mouth Daily., Disp: , Rfl:   •  metoprolol succinate XL (TOPROL-XL) 50 MG 24 hr tablet, Every 12 (Twelve) Hours., Disp: , Rfl:     No Known Allergies     Family History   Problem Relation Age of Onset   • Prostate cancer Brother    • Urolithiasis Brother        Social History     Socioeconomic History   • Marital status: Single     Spouse name: Not on file   • Number of children: Not on file   • Years of  "education: Not on file   • Highest education level: Not on file   Tobacco Use   • Smoking status: Never Smoker   • Smokeless tobacco: Never Used   Vaping Use   • Vaping Use: Never used   Substance and Sexual Activity   • Alcohol use: Yes     Comment: light drinker       Vital Signs:   Pulse 70   Ht 182.9 cm (72\")   Wt 117 kg (258 lb)   BMI 34.99 kg/m²      Physical Exam  Genitourinary:     Prostate: Enlarged (mild) and nodules present (midline distal BB sized firm nodule ). Not tender.          Result Review :   The following data was reviewed by: NORA Chang on 08/26/2021:  Results for orders placed or performed in visit on 08/26/21   POC Urinalysis Dipstick, Automated    Specimen: Urine   Result Value Ref Range    Color Yellow Yellow, Straw, Dark Yellow, Veroniac    Clarity, UA Clear Clear    Specific Gravity  1.025 1.005 - 1.030    pH, Urine 6.5 5.0 - 8.0    Leukocytes Negative Negative    Nitrite, UA Negative Negative    Protein, POC Negative Negative mg/dL    Glucose, UA Negative Negative, 1000 mg/dL (3+) mg/dL    Ketones, UA Negative Negative    Urobilinogen, UA Normal Normal    Bilirubin Negative Negative    Blood, UA Negative Negative      PSA    PSA 1/29/21 8/25/21   PSA 4.61 (A) 5.580 (A)   (A) Abnormal value                Procedures        Assessment and Plan    Diagnoses and all orders for this visit:    1. Elevated prostate specific antigen (PSA) (Primary)  -     POC Urinalysis Dipstick, Automated  -     MRI Prostate With & Without Contrast; Future    2. Prostate nodule  -     MRI Prostate With & Without Contrast; Future      Discussed with patient at this point in time given the prostate nodule found on exam and that his PSA has continued to climb we will get him scheduled for MRI of the prostate and follow-up with him in office 1 week after with results.    I spent 10 minutes caring for David on this date of service. This time includes time spent by me in the following " activities:reviewing tests, obtaining and/or reviewing a separately obtained history, performing a medically appropriate examination and/or evaluation , counseling and educating the patient/family/caregiver, ordering medications, tests, or procedures, and documenting information in the medical record  Follow Up   Return for mri results .  Patient was given instructions and counseling regarding his condition or for health maintenance advice. Please see specific information pulled into the AVS if appropriate.         This document has been electronically signed by NORA Chang  August 26, 2021 09:54 EDT

## 2021-09-27 ENCOUNTER — TELEPHONE (OUTPATIENT)
Dept: UROLOGY | Facility: CLINIC | Age: 66
End: 2021-09-27

## 2021-09-27 DIAGNOSIS — R97.20 ELEVATED PROSTATE SPECIFIC ANTIGEN (PSA): Primary | ICD-10-CM

## 2021-09-27 NOTE — TELEPHONE ENCOUNTER
Called patient with results of MRI.  Patient's MRI of the prostate is negative for any lesions suspicious for prostate cancer only shows changes of BPH.  Patient will need to be rescheduled for a follow-up appointment in 6 months with a repeat PSA. No need to see him on 10/1/21.

## 2021-09-27 NOTE — TELEPHONE ENCOUNTER
Patient called and I gave him the results, per Alisia. I told him he will need PSA prior and appointment desk rescheduled to 6 months for f/u.

## 2021-11-12 ENCOUNTER — LAB (OUTPATIENT)
Dept: LAB | Facility: HOSPITAL | Age: 66
End: 2021-11-12

## 2021-11-12 ENCOUNTER — TELEPHONE (OUTPATIENT)
Dept: INTERNAL MEDICINE | Facility: CLINIC | Age: 66
End: 2021-11-12

## 2021-11-12 DIAGNOSIS — E55.9 VITAMIN D DEFICIENCY: ICD-10-CM

## 2021-11-12 DIAGNOSIS — E78.5 HYPERLIPIDEMIA, UNSPECIFIED HYPERLIPIDEMIA TYPE: ICD-10-CM

## 2021-11-12 DIAGNOSIS — R73.01 IMPAIRED FASTING GLUCOSE: ICD-10-CM

## 2021-11-12 DIAGNOSIS — I10 PRIMARY HYPERTENSION: ICD-10-CM

## 2021-11-12 DIAGNOSIS — I10 PRIMARY HYPERTENSION: Primary | ICD-10-CM

## 2021-11-12 LAB
25(OH)D3 SERPL-MCNC: 21.7 NG/ML
ALBUMIN SERPL-MCNC: 4.4 G/DL (ref 3.5–5.2)
ALBUMIN/GLOB SERPL: 1.5 G/DL
ALP SERPL-CCNC: 90 U/L (ref 39–117)
ALT SERPL W P-5'-P-CCNC: 19 U/L (ref 1–41)
ANION GAP SERPL CALCULATED.3IONS-SCNC: 6.1 MMOL/L (ref 5–15)
AST SERPL-CCNC: 21 U/L (ref 1–40)
BASOPHILS # BLD AUTO: 0.07 10*3/MM3 (ref 0–0.2)
BASOPHILS NFR BLD AUTO: 0.9 % (ref 0–1.5)
BILIRUB SERPL-MCNC: 1 MG/DL (ref 0–1.2)
BUN SERPL-MCNC: 14 MG/DL (ref 8–23)
BUN/CREAT SERPL: 13.5 (ref 7–25)
CALCIUM SPEC-SCNC: 9.4 MG/DL (ref 8.6–10.5)
CHLORIDE SERPL-SCNC: 102 MMOL/L (ref 98–107)
CHOLEST SERPL-MCNC: 141 MG/DL (ref 0–200)
CO2 SERPL-SCNC: 30.9 MMOL/L (ref 22–29)
CREAT SERPL-MCNC: 1.04 MG/DL (ref 0.76–1.27)
DEPRECATED RDW RBC AUTO: 39.1 FL (ref 37–54)
EOSINOPHIL # BLD AUTO: 0.33 10*3/MM3 (ref 0–0.4)
EOSINOPHIL NFR BLD AUTO: 4.2 % (ref 0.3–6.2)
ERYTHROCYTE [DISTWIDTH] IN BLOOD BY AUTOMATED COUNT: 12.6 % (ref 12.3–15.4)
GFR SERPL CREATININE-BSD FRML MDRD: 72 ML/MIN/1.73
GLOBULIN UR ELPH-MCNC: 3 GM/DL
GLUCOSE SERPL-MCNC: 95 MG/DL (ref 65–99)
HBA1C MFR BLD: 5.3 % (ref 4.8–5.6)
HCT VFR BLD AUTO: 43.7 % (ref 37.5–51)
HDLC SERPL-MCNC: 31 MG/DL (ref 40–60)
HGB BLD-MCNC: 15.1 G/DL (ref 13–17.7)
IMM GRANULOCYTES # BLD AUTO: 0.01 10*3/MM3 (ref 0–0.05)
IMM GRANULOCYTES NFR BLD AUTO: 0.1 % (ref 0–0.5)
LDLC SERPL CALC-MCNC: 89 MG/DL (ref 0–100)
LDLC/HDLC SERPL: 2.83 {RATIO}
LYMPHOCYTES # BLD AUTO: 2.57 10*3/MM3 (ref 0.7–3.1)
LYMPHOCYTES NFR BLD AUTO: 33 % (ref 19.6–45.3)
MCH RBC QN AUTO: 29.6 PG (ref 26.6–33)
MCHC RBC AUTO-ENTMCNC: 34.6 G/DL (ref 31.5–35.7)
MCV RBC AUTO: 85.7 FL (ref 79–97)
MONOCYTES # BLD AUTO: 0.52 10*3/MM3 (ref 0.1–0.9)
MONOCYTES NFR BLD AUTO: 6.7 % (ref 5–12)
NEUTROPHILS NFR BLD AUTO: 4.28 10*3/MM3 (ref 1.7–7)
NEUTROPHILS NFR BLD AUTO: 55.1 % (ref 42.7–76)
NRBC BLD AUTO-RTO: 0.1 /100 WBC (ref 0–0.2)
PLATELET # BLD AUTO: 249 10*3/MM3 (ref 140–450)
PMV BLD AUTO: 10.8 FL (ref 6–12)
POTASSIUM SERPL-SCNC: 4 MMOL/L (ref 3.5–5.2)
PROT SERPL-MCNC: 7.4 G/DL (ref 6–8.5)
RBC # BLD AUTO: 5.1 10*6/MM3 (ref 4.14–5.8)
SODIUM SERPL-SCNC: 139 MMOL/L (ref 136–145)
T4 FREE SERPL-MCNC: 1.27 NG/DL (ref 0.93–1.7)
TRIGL SERPL-MCNC: 111 MG/DL (ref 0–150)
TSH SERPL DL<=0.05 MIU/L-ACNC: 2.97 UIU/ML (ref 0.27–4.2)
VLDLC SERPL-MCNC: 21 MG/DL (ref 5–40)
WBC # BLD AUTO: 7.78 10*3/MM3 (ref 3.4–10.8)

## 2021-11-12 PROCEDURE — 85025 COMPLETE CBC W/AUTO DIFF WBC: CPT

## 2021-11-12 PROCEDURE — 36415 COLL VENOUS BLD VENIPUNCTURE: CPT

## 2021-11-12 PROCEDURE — 82306 VITAMIN D 25 HYDROXY: CPT

## 2021-11-12 PROCEDURE — 80061 LIPID PANEL: CPT

## 2021-11-12 PROCEDURE — 83036 HEMOGLOBIN GLYCOSYLATED A1C: CPT

## 2021-11-12 PROCEDURE — 84443 ASSAY THYROID STIM HORMONE: CPT

## 2021-11-12 PROCEDURE — 80053 COMPREHEN METABOLIC PANEL: CPT

## 2021-11-12 PROCEDURE — 84439 ASSAY OF FREE THYROXINE: CPT

## 2021-11-22 ENCOUNTER — OFFICE VISIT (OUTPATIENT)
Dept: INTERNAL MEDICINE | Facility: CLINIC | Age: 66
End: 2021-11-22

## 2021-11-22 VITALS
HEART RATE: 78 BPM | WEIGHT: 261 LBS | HEIGHT: 72 IN | TEMPERATURE: 97.5 F | RESPIRATION RATE: 18 BRPM | OXYGEN SATURATION: 99 % | DIASTOLIC BLOOD PRESSURE: 86 MMHG | SYSTOLIC BLOOD PRESSURE: 130 MMHG | BODY MASS INDEX: 35.35 KG/M2

## 2021-11-22 DIAGNOSIS — E78.5 HYPERLIPIDEMIA, UNSPECIFIED HYPERLIPIDEMIA TYPE: ICD-10-CM

## 2021-11-22 DIAGNOSIS — Z23 NEED FOR IMMUNIZATION AGAINST INFLUENZA: ICD-10-CM

## 2021-11-22 DIAGNOSIS — E55.9 VITAMIN D DEFICIENCY: ICD-10-CM

## 2021-11-22 DIAGNOSIS — Z23 NEED FOR INFLUENZA VACCINATION: ICD-10-CM

## 2021-11-22 DIAGNOSIS — I10 PRIMARY HYPERTENSION: Primary | ICD-10-CM

## 2021-11-22 DIAGNOSIS — Z23 NEED FOR PNEUMOCOCCAL VACCINE: ICD-10-CM

## 2021-11-22 DIAGNOSIS — I48.20 CHRONIC ATRIAL FIBRILLATION (HCC): ICD-10-CM

## 2021-11-22 PROBLEM — I48.11 LONGSTANDING PERSISTENT ATRIAL FIBRILLATION: Status: ACTIVE | Noted: 2021-11-22

## 2021-11-22 PROCEDURE — 99214 OFFICE O/P EST MOD 30 MIN: CPT

## 2021-11-22 PROCEDURE — G0008 ADMIN INFLUENZA VIRUS VAC: HCPCS

## 2021-11-22 PROCEDURE — 90670 PCV13 VACCINE IM: CPT

## 2021-11-22 PROCEDURE — 90662 IIV NO PRSV INCREASED AG IM: CPT

## 2021-11-22 PROCEDURE — G0009 ADMIN PNEUMOCOCCAL VACCINE: HCPCS

## 2021-11-22 RX ORDER — ERGOCALCIFEROL 1.25 MG/1
50000 CAPSULE ORAL WEEKLY
Qty: 12 CAPSULE | Refills: 1 | Status: SHIPPED | OUTPATIENT
Start: 2021-11-22 | End: 2022-10-11

## 2021-11-22 NOTE — ASSESSMENT & PLAN NOTE
Lipid abnormalities are stable.  Cholesterol is well controlled.  He is taking pravastatin 40 mg daily.  He does have a family history of coronary artery disease.  He does see Dr. Kat  Plan: Continue pravastatin daily.

## 2021-11-22 NOTE — ASSESSMENT & PLAN NOTE
Vitamin D is low.  Patient is not taking any supplementation.  Plan: I will add in vitamin D 50,000 units weekly.

## 2021-11-22 NOTE — PROGRESS NOTES
"Chief Complaint  Labs Only (blood test done.) and Immunizations (high dose flu)    Subjective          History of Present Illness  David Scott presents to [facility]  For his 6 month follow up on htn, hyperlipidemia, af\fib, and elevated PSA.  No new issues or complaints.  Denies chest pain, shortness of breath, palpitations, changes in bowel or bladder.    He has had both COVID-19 vaccines and the booster.  Recommended Shingrix  He is getting his flu and Prevnar 13 today.  PSA-Urology checks, PSA runs high, MRI of prostate runs high  Colonoscopy-4/2021  Objective   Vital Signs:   /86 (BP Location: Right leg, Patient Position: Sitting, Cuff Size: Large Adult)   Pulse 78   Temp 97.5 °F (36.4 °C) (Temporal)   Resp 18   Ht 182.9 cm (72\")   Wt 118 kg (261 lb)   SpO2 99%   BMI 35.40 kg/m²     Physical Exam  HENT:      Head: Normocephalic.   Eyes:      Extraocular Movements: Extraocular movements intact.      Conjunctiva/sclera: Conjunctivae normal.   Cardiovascular:      Rate and Rhythm: Normal rate and regular rhythm.      Pulses: Normal pulses.      Heart sounds: Normal heart sounds. No murmur heard.      Pulmonary:      Effort: Pulmonary effort is normal. No respiratory distress.      Breath sounds: Normal breath sounds. No stridor. No wheezing, rhonchi or rales.   Abdominal:      General: Bowel sounds are normal.      Palpations: Abdomen is soft.      Tenderness: There is no abdominal tenderness.   Musculoskeletal:         General: Normal range of motion.      Cervical back: Normal range of motion and neck supple. No tenderness.      Right lower leg: No edema.      Left lower leg: No edema.   Lymphadenopathy:      Cervical: No cervical adenopathy.   Skin:     General: Skin is warm and dry.   Neurological:      General: No focal deficit present.      Mental Status: He is alert and oriented to person, place, and time.   Psychiatric:         Mood and Affect: Affect is " flat.         Behavior: Behavior normal.         Thought Content: Thought content normal.         Judgment: Judgment normal.        Result Review :   The following data was reviewed by: NORA Harley on 11/22/2021:  CMP    CMP 5/19/21 7/8/21 11/12/21   Glucose 101 (A) 91 95   BUN 10 11 14   Creatinine 0.92 1.04 1.04   eGFR Non African Am  72 72   Sodium 141 140 139   Potassium 3.9 4.1 4.0   Chloride 103 102 102   Calcium 9.0 9.3 9.4   Albumin 3.9 4.10 4.40   Total Bilirubin 0.78 1.1 1.0   Alkaline Phosphatase 83 81 90   AST (SGOT) 18 13 21   ALT (SGPT) 14 15 19   (A) Abnormal value       Comments are available for some flowsheets but are not being displayed.           CBC w/diff    CBC w/Diff 5/19/21 11/12/21   WBC 7.55 7.78   RBC 4.88 5.10   Hemoglobin 14.3 15.1   Hematocrit 41.8 (A) 43.7   MCV 85.7 85.7   MCH 29.3 29.6   MCHC 34.2 34.6   RDW 12.4 12.6   Platelets 225 249   Neutrophil Rel % 58.3 55.1   Immature Granulocyte Rel %  0.1   Lymphocyte Rel % 29.5 33.0   Monocyte Rel % 7.3 6.7   Eosinophil Rel % 4.0 4.2   Basophil Rel % 0.8 0.9   (A) Abnormal value            Lipid Panel    Lipid Panel 5/19/21 7/8/21 11/12/21   Total Cholesterol  115 141   Total Cholesterol 111     Triglycerides 88 108 111   HDL Cholesterol 32 (A) 30 (A) 31 (A)   VLDL Cholesterol 18 20 21   LDL Cholesterol  61 (A) 65 89   LDL/HDL Ratio  2.11 2.83   (A) Abnormal value       Comments are available for some flowsheets but are not being displayed.           TSH    TSH 5/19/21 11/12/21   TSH 3.760 2.970                     Assessment and Plan    Diagnoses and all orders for this visit:    1. Primary hypertension (Primary)  Assessment & Plan:  Hypertension is stable.  Blood pressure is 130/86 today in the office he is taking Hyzaar 100-12.5 mg daily.  Tolerating well.  Plan: Continue Hyzaar.    Orders:  -     Comprehensive Metabolic Panel; Future  -     CBC & Differential; Future  -     Lipid Panel; Future  -     TSH; Future  -      Urinalysis With Microscopic - Urine, Clean Catch; Future    2. Hyperlipidemia, unspecified hyperlipidemia type  Assessment & Plan:  Lipid abnormalities are stable.  Cholesterol is well controlled.  He is taking pravastatin 40 mg daily.  He does have a family history of coronary artery disease.  He does see Dr. Kat  Plan: Continue pravastatin daily.    Orders:  -     Comprehensive Metabolic Panel; Future  -     CBC & Differential; Future  -     Lipid Panel; Future  -     TSH; Future  -     Urinalysis With Microscopic - Urine, Clean Catch; Future    3. Vitamin D deficiency  Assessment & Plan:  Vitamin D is low.  Patient is not taking any supplementation.  Plan: I will add in vitamin D 50,000 units weekly.    Orders:  -     Vitamin D 25 Hydroxy; Future    4. Need for immunization against influenza    5. Chronic atrial fibrillation (HCC)  Assessment & Plan:  Patient does have a history of A. fib.  He is rate controlled with Lopressor 50 mg twice daily.  Rate is controlled today in the office.  Patient is also anticoagulated with Eliquis 5 mg twice daily.  Patient follows with Dr. KAT every 3 months.  Plan: Continue Eliquis and Lopressor.  Follow-up with cardiology.      6. Need for influenza vaccination  -     Fluzone High-Dose 65+yrs    Other orders  -     vitamin D (ERGOCALCIFEROL) 1.25 MG (58421 UT) capsule capsule; Take 1 capsule by mouth 1 (One) Time Per Week.  Dispense: 12 capsule; Refill: 1      Follow Up   No follow-ups on file.  Patient was given instructions and counseling regarding his condition or for health maintenance advice. Please see specific information pulled into the AVS if appropriate.

## 2021-11-22 NOTE — ASSESSMENT & PLAN NOTE
Patient does have a history of A. fib.  He is rate controlled with Lopressor 50 mg twice daily.  Rate is controlled today in the office.  Patient is also anticoagulated with Eliquis 5 mg twice daily.  Patient follows with Dr. RENDON every 3 months.  Plan: Continue Eliquis and Lopressor.  Follow-up with cardiology.

## 2021-11-22 NOTE — ASSESSMENT & PLAN NOTE
Hypertension is stable.  Blood pressure is 130/86 today in the office he is taking Hyzaar 100-12.5 mg daily.  Tolerating well.  Plan: Continue Hyzaar.

## 2022-03-23 ENCOUNTER — LAB (OUTPATIENT)
Dept: LAB | Facility: HOSPITAL | Age: 67
End: 2022-03-23

## 2022-03-23 DIAGNOSIS — R97.20 ELEVATED PROSTATE SPECIFIC ANTIGEN (PSA): ICD-10-CM

## 2022-03-23 LAB — PSA SERPL-MCNC: 5.47 NG/ML (ref 0–4)

## 2022-03-23 PROCEDURE — 36415 COLL VENOUS BLD VENIPUNCTURE: CPT

## 2022-03-23 PROCEDURE — 84153 ASSAY OF PSA TOTAL: CPT

## 2022-03-29 ENCOUNTER — OFFICE VISIT (OUTPATIENT)
Dept: UROLOGY | Facility: CLINIC | Age: 67
End: 2022-03-29

## 2022-03-29 VITALS — RESPIRATION RATE: 12 BRPM | BODY MASS INDEX: 35.62 KG/M2 | WEIGHT: 263 LBS | HEIGHT: 72 IN

## 2022-03-29 DIAGNOSIS — N40.0 BENIGN PROSTATIC HYPERPLASIA WITHOUT LOWER URINARY TRACT SYMPTOMS: Primary | ICD-10-CM

## 2022-03-29 DIAGNOSIS — R97.20 ELEVATED PROSTATE SPECIFIC ANTIGEN (PSA): ICD-10-CM

## 2022-03-29 PROBLEM — E88.81 METABOLIC SYNDROME: Status: ACTIVE | Noted: 2022-03-29

## 2022-03-29 PROBLEM — Z79.01 LONG TERM (CURRENT) USE OF ANTICOAGULANTS: Status: ACTIVE | Noted: 2022-03-29

## 2022-03-29 PROBLEM — I48.0 PAROXYSMAL ATRIAL FIBRILLATION: Status: ACTIVE | Noted: 2021-11-22

## 2022-03-29 PROBLEM — I51.7 CARDIOMEGALY: Status: ACTIVE | Noted: 2022-03-29

## 2022-03-29 PROBLEM — I05.9 MITRAL VALVE DISORDER: Status: ACTIVE | Noted: 2022-03-29

## 2022-03-29 PROBLEM — E88.810 METABOLIC SYNDROME: Status: ACTIVE | Noted: 2022-03-29

## 2022-03-29 PROBLEM — R01.1 SYSTOLIC MURMUR: Status: ACTIVE | Noted: 2022-03-29

## 2022-03-29 PROCEDURE — 99212 OFFICE O/P EST SF 10 MIN: CPT | Performed by: NURSE PRACTITIONER

## 2022-03-29 RX ORDER — LOSARTAN POTASSIUM AND HYDROCHLOROTHIAZIDE 12.5; 5 MG/1; MG/1
0.5 TABLET ORAL DAILY
COMMUNITY
Start: 2022-03-15 | End: 2022-10-11

## 2022-03-29 NOTE — PROGRESS NOTES
"Chief Complaint: Elevated PSA (Pt here for follow up.  PSA in chart)    Subjective         History of Present Illness  David Scott is a 66 y.o. male presents to [facility] to be seen for f/u on elevated PSA.    His PSA previously was 5.58 and is now 5.4.    MRI negative in 9/2021 108gm prostate    He states he is asymptomatic now.    Nocturia x 0-2    He denies straining to void.    No post void dribble.      No perineal pain.     Objective     Past Medical History:   Diagnosis Date   • Chronic allergic rhinitis    • High blood pressure    • Personal history of colonic polyps 01/15/2016       Past Surgical History:   Procedure Laterality Date   • COLONOSCOPY           Current Outpatient Medications:   •  losartan-hydrochlorothiazide (HYZAAR) 50-12.5 MG per tablet, Take 0.5 tablets by mouth Daily., Disp: , Rfl:   •  metoprolol tartrate (LOPRESSOR) 50 MG tablet, Take 50 mg by mouth 2 (Two) Times a Day., Disp: , Rfl:   •  pravastatin (PRAVACHOL) 40 MG tablet, Daily., Disp: , Rfl:   •  vitamin D (ERGOCALCIFEROL) 1.25 MG (84902 UT) capsule capsule, Take 1 capsule by mouth 1 (One) Time Per Week., Disp: 12 capsule, Rfl: 1  •  Eliquis 5 MG tablet tablet, Take 5 mg by mouth 2 (Two) Times a Day., Disp: , Rfl:     No Known Allergies     Family History   Problem Relation Age of Onset   • Prostate cancer Brother    • Urolithiasis Brother        Social History     Socioeconomic History   • Marital status: Single   Tobacco Use   • Smoking status: Never Smoker   • Smokeless tobacco: Never Used   Vaping Use   • Vaping Use: Never used   Substance and Sexual Activity   • Alcohol use: Yes     Comment: light drinker   • Drug use: Never   • Sexual activity: Defer       Vital Signs:   Resp 12   Ht 182.9 cm (72\")   Wt 119 kg (263 lb)   BMI 35.67 kg/m²           Result Review :   The following data was reviewed by: NORA Chang on 08/26/2021:  Results for orders placed or performed in visit on 03/23/22 "   PSA DIAGNOSTIC    Specimen: Blood   Result Value Ref Range    PSA 5.470 (H) 0.000 - 4.000 ng/mL      PSA    PSA 8/25/21 3/23/22   PSA 5.580 (A) 5.470 (A)   (A) Abnormal value            EXAMINATION:   MRI pelvis without and with contrast (prostate protocol).    DATE: 09/25/2021     HISTORY: ELEVATED PSA. Patient reports no prior biopsies.    TECHNIQUE: Multiparametric prostate protocol with high-resolution T1 and T2 weighted sequences precontrast, diffusion weighted imaging, and multiphase dynamic gadolinium-enhanced gradient echo imaging during contrast administration. External 3D   processing, segmentation, and volume assessment was performed by the radiologist using an external . Lesion localization, where appropriate, was also performed by the radiologist to be available for MR/US fusion. No prior imaging is available for   comparison..    FINDINGS:   The prostate is enlarged measuring 6.0 x 5.9 x 6.8 cm with an estimated volume of 108 mL.    There is enlargement the transition zone T2 heterogeneous nodules in a pattern compatible with benign prostatic hyperplasia. No focal abnormality suspicious for clinically significant prostate cancer.    There is some thinning of the peripheral zone and heterogeneous T2 signal but no focal abnormality suspicious for clinically significant prostate cancer.    Seminal vesicles are symmetric. Urinary bladder wall thickening is likely exaggerated by underdistention. Small bilateral inguinal hernias containing fat. No lymphadenopathy or suspicious focal osseous lesions.    IMPRESSION:  1. No focal abnormality suspicious for clinically significant prostate cancer.  2. Prostate enlargement with benign prostatic hyperplasia.  3. Peripheral zone changes favored to reflect inflammation and/or scarring.    Dictated by: Dana Eddy M.D.    Images and Report reviewed and interpreted by: Dana Eddy M.D.    Procedures        Assessment and Plan    Diagnoses and all  orders for this visit:    1. Benign prostatic hyperplasia without lower urinary tract symptoms (Primary)    2. Elevated prostate specific antigen (PSA)  -     PSA DIAGNOSTIC; Future      Discussed with patient at this time as his PSA is still the same and MRI is WNL especially given his PSA density is .005. we will repeat psa in 6 months and f/u. If PSA stays around the same then we will resume annual f/u.    I spent 10 minutes caring for David on this date of service. This time includes time spent by me in the following activities:reviewing tests, obtaining and/or reviewing a separately obtained history, performing a medically appropriate examination and/or evaluation , counseling and educating the patient/family/caregiver, ordering medications, tests, or procedures, and documenting information in the medical record  Follow Up   Return in about 6 months (around 9/29/2022) for f/u PSA .  Patient was given instructions and counseling regarding his condition or for health maintenance advice. Please see specific information pulled into the AVS if appropriate.         This document has been electronically signed by NORA Chang  March 29, 2022 08:31 EDT

## 2022-04-04 ENCOUNTER — LAB (OUTPATIENT)
Dept: LAB | Facility: HOSPITAL | Age: 67
End: 2022-04-04

## 2022-04-04 DIAGNOSIS — E78.5 HYPERLIPIDEMIA, UNSPECIFIED HYPERLIPIDEMIA TYPE: ICD-10-CM

## 2022-04-04 DIAGNOSIS — I10 PRIMARY HYPERTENSION: ICD-10-CM

## 2022-04-04 DIAGNOSIS — E55.9 VITAMIN D DEFICIENCY: ICD-10-CM

## 2022-04-04 LAB
25(OH)D3 SERPL-MCNC: 36.4 NG/ML (ref 30–100)
ALBUMIN SERPL-MCNC: 4.1 G/DL (ref 3.5–5.2)
ALBUMIN/GLOB SERPL: 1.5 G/DL
ALP SERPL-CCNC: 79 U/L (ref 39–117)
ALT SERPL W P-5'-P-CCNC: 17 U/L (ref 1–41)
ANION GAP SERPL CALCULATED.3IONS-SCNC: 8.2 MMOL/L (ref 5–15)
AST SERPL-CCNC: 13 U/L (ref 1–40)
BACTERIA UR QL AUTO: NORMAL /HPF
BASOPHILS # BLD AUTO: 0.08 10*3/MM3 (ref 0–0.2)
BASOPHILS NFR BLD AUTO: 1 % (ref 0–1.5)
BILIRUB SERPL-MCNC: 0.9 MG/DL (ref 0–1.2)
BILIRUB UR QL STRIP: NEGATIVE
BUN SERPL-MCNC: 10 MG/DL (ref 8–23)
BUN/CREAT SERPL: 10.9 (ref 7–25)
CALCIUM SPEC-SCNC: 9.3 MG/DL (ref 8.6–10.5)
CHLORIDE SERPL-SCNC: 102 MMOL/L (ref 98–107)
CHOLEST SERPL-MCNC: 115 MG/DL (ref 0–200)
CLARITY UR: CLEAR
CO2 SERPL-SCNC: 28.8 MMOL/L (ref 22–29)
COLOR UR: ABNORMAL
CREAT SERPL-MCNC: 0.92 MG/DL (ref 0.76–1.27)
DEPRECATED RDW RBC AUTO: 38.2 FL (ref 37–54)
EGFRCR SERPLBLD CKD-EPI 2021: 92.3 ML/MIN/1.73
EOSINOPHIL # BLD AUTO: 0.26 10*3/MM3 (ref 0–0.4)
EOSINOPHIL NFR BLD AUTO: 3.4 % (ref 0.3–6.2)
ERYTHROCYTE [DISTWIDTH] IN BLOOD BY AUTOMATED COUNT: 12.6 % (ref 12.3–15.4)
GLOBULIN UR ELPH-MCNC: 2.8 GM/DL
GLUCOSE SERPL-MCNC: 96 MG/DL (ref 65–99)
GLUCOSE UR STRIP-MCNC: NEGATIVE MG/DL
HCT VFR BLD AUTO: 43.1 % (ref 37.5–51)
HDLC SERPL-MCNC: 33 MG/DL (ref 40–60)
HGB BLD-MCNC: 15.3 G/DL (ref 13–17.7)
HGB UR QL STRIP.AUTO: NEGATIVE
HYALINE CASTS UR QL AUTO: NORMAL /LPF
IMM GRANULOCYTES # BLD AUTO: 0.02 10*3/MM3 (ref 0–0.05)
IMM GRANULOCYTES NFR BLD AUTO: 0.3 % (ref 0–0.5)
KETONES UR QL STRIP: ABNORMAL
LDLC SERPL CALC-MCNC: 63 MG/DL (ref 0–100)
LDLC/HDLC SERPL: 1.87 {RATIO}
LEUKOCYTE ESTERASE UR QL STRIP.AUTO: NEGATIVE
LYMPHOCYTES # BLD AUTO: 2.6 10*3/MM3 (ref 0.7–3.1)
LYMPHOCYTES NFR BLD AUTO: 33.9 % (ref 19.6–45.3)
MCH RBC QN AUTO: 29.9 PG (ref 26.6–33)
MCHC RBC AUTO-ENTMCNC: 35.5 G/DL (ref 31.5–35.7)
MCV RBC AUTO: 84.2 FL (ref 79–97)
MONOCYTES # BLD AUTO: 0.55 10*3/MM3 (ref 0.1–0.9)
MONOCYTES NFR BLD AUTO: 7.2 % (ref 5–12)
NEUTROPHILS NFR BLD AUTO: 4.17 10*3/MM3 (ref 1.7–7)
NEUTROPHILS NFR BLD AUTO: 54.2 % (ref 42.7–76)
NITRITE UR QL STRIP: NEGATIVE
NRBC BLD AUTO-RTO: 0 /100 WBC (ref 0–0.2)
PH UR STRIP.AUTO: 5.5 [PH] (ref 5–8)
PLATELET # BLD AUTO: 225 10*3/MM3 (ref 140–450)
PMV BLD AUTO: 10.4 FL (ref 6–12)
POTASSIUM SERPL-SCNC: 4.1 MMOL/L (ref 3.5–5.2)
PROT SERPL-MCNC: 6.9 G/DL (ref 6–8.5)
PROT UR QL STRIP: ABNORMAL
RBC # BLD AUTO: 5.12 10*6/MM3 (ref 4.14–5.8)
RBC # UR STRIP: NORMAL /HPF
REF LAB TEST METHOD: NORMAL
SODIUM SERPL-SCNC: 139 MMOL/L (ref 136–145)
SP GR UR STRIP: 1.02 (ref 1–1.03)
SQUAMOUS #/AREA URNS HPF: NORMAL /HPF
TRIGL SERPL-MCNC: 101 MG/DL (ref 0–150)
TSH SERPL DL<=0.05 MIU/L-ACNC: 2.85 UIU/ML (ref 0.27–4.2)
UROBILINOGEN UR QL STRIP: ABNORMAL
VLDLC SERPL-MCNC: 19 MG/DL (ref 5–40)
WBC # UR STRIP: NORMAL /HPF
WBC NRBC COR # BLD: 7.68 10*3/MM3 (ref 3.4–10.8)

## 2022-04-04 PROCEDURE — 85025 COMPLETE CBC W/AUTO DIFF WBC: CPT

## 2022-04-04 PROCEDURE — 36415 COLL VENOUS BLD VENIPUNCTURE: CPT

## 2022-04-04 PROCEDURE — 82306 VITAMIN D 25 HYDROXY: CPT

## 2022-04-04 PROCEDURE — 80053 COMPREHEN METABOLIC PANEL: CPT

## 2022-04-04 PROCEDURE — 81001 URINALYSIS AUTO W/SCOPE: CPT

## 2022-04-04 PROCEDURE — 80061 LIPID PANEL: CPT

## 2022-04-04 PROCEDURE — 84443 ASSAY THYROID STIM HORMONE: CPT

## 2022-04-11 ENCOUNTER — OFFICE VISIT (OUTPATIENT)
Dept: INTERNAL MEDICINE | Facility: CLINIC | Age: 67
End: 2022-04-11

## 2022-04-11 VITALS
TEMPERATURE: 98.2 F | HEIGHT: 72 IN | HEART RATE: 79 BPM | OXYGEN SATURATION: 97 % | SYSTOLIC BLOOD PRESSURE: 132 MMHG | DIASTOLIC BLOOD PRESSURE: 88 MMHG | WEIGHT: 262 LBS | RESPIRATION RATE: 20 BRPM | BODY MASS INDEX: 35.49 KG/M2

## 2022-04-11 DIAGNOSIS — E78.2 MIXED HYPERLIPIDEMIA: ICD-10-CM

## 2022-04-11 DIAGNOSIS — R80.9 PROTEINURIA, UNSPECIFIED TYPE: ICD-10-CM

## 2022-04-11 DIAGNOSIS — N40.0 BENIGN PROSTATIC HYPERPLASIA WITHOUT LOWER URINARY TRACT SYMPTOMS: ICD-10-CM

## 2022-04-11 DIAGNOSIS — Z11.59 NEED FOR HEPATITIS C SCREENING TEST: ICD-10-CM

## 2022-04-11 DIAGNOSIS — E66.09 CLASS 2 OBESITY DUE TO EXCESS CALORIES WITH BODY MASS INDEX (BMI) OF 35.0 TO 35.9 IN ADULT, UNSPECIFIED WHETHER SERIOUS COMORBIDITY PRESENT: ICD-10-CM

## 2022-04-11 DIAGNOSIS — E55.9 VITAMIN D DEFICIENCY: ICD-10-CM

## 2022-04-11 DIAGNOSIS — I10 PRIMARY HYPERTENSION: Primary | ICD-10-CM

## 2022-04-11 DIAGNOSIS — R97.20 ELEVATED PROSTATE SPECIFIC ANTIGEN (PSA): ICD-10-CM

## 2022-04-11 DIAGNOSIS — B35.1 ONYCHOMYCOSIS: ICD-10-CM

## 2022-04-11 DIAGNOSIS — I48.0 PAROXYSMAL ATRIAL FIBRILLATION: ICD-10-CM

## 2022-04-11 PROBLEM — E66.812 CLASS 2 OBESITY DUE TO EXCESS CALORIES WITH BODY MASS INDEX (BMI) OF 35.0 TO 35.9 IN ADULT: Status: ACTIVE | Noted: 2022-04-11

## 2022-04-11 PROCEDURE — 99214 OFFICE O/P EST MOD 30 MIN: CPT

## 2022-04-11 RX ORDER — TERBINAFINE HYDROCHLORIDE 250 MG/1
250 TABLET ORAL DAILY
Qty: 42 TABLET | Refills: 1 | Status: SHIPPED | OUTPATIENT
Start: 2022-04-11 | End: 2022-07-04

## 2022-04-11 NOTE — ASSESSMENT & PLAN NOTE
Lipids are very well controlled.  Patient is on pravastatin, tolerating well.  Plan: Continue current regimen.

## 2022-04-11 NOTE — ASSESSMENT & PLAN NOTE
Patient's (Body mass index is 35.53 kg/m².) indicates that they are morbidly obese (BMI > 40 or > 35 with obesity - related health condition) with health conditions that include hypertension and dyslipidemias . Weight is unchanged.   Patient encouraged to start exercising. He admits to not being active.  Increase activity as tolerated.

## 2022-04-11 NOTE — ASSESSMENT & PLAN NOTE
Vitamin D is stable. Vit D is 36.4.  He has been taking Vit D 50,000 units weekly.  Plan: continue

## 2022-04-11 NOTE — ASSESSMENT & PLAN NOTE
Left foot, multiple toe nails.  Pt has had treatment years ago for this and he tolerated well.  Plan: Start lamisil. Monitor labs.

## 2022-04-11 NOTE — ASSESSMENT & PLAN NOTE
Hypertension is stable. BP is 132/88 today in the office.  Patient is on hyzaar 100-12.5 mg daily, tolerating well.  Plan: continue current regimen.  UA did have some protein. Pt to keep log for a month and we will recheck labs.

## 2022-04-11 NOTE — ASSESSMENT & PLAN NOTE
Elevated PSA.  MRI of Prostate-BPH, no suspicion for malignancy  Followed by urology, PSA ordered for Sept

## 2022-04-11 NOTE — ASSESSMENT & PLAN NOTE
History of afib. He is rate controlled with lopressor 50 mg twice daily.  Anticoagulated with eliquis 5 mg BID.  Pt continues with Dr. Kat q 3 months.  No recent events.  Denies any palpitations, soa or chest pain.   Plan: Continue current regimen, follow with Dr. Kat.

## 2022-04-11 NOTE — PROGRESS NOTES
"Chief Complaint  Labs Only (Following up about labs.), Arthritis (Patient believes he is getting arthritis in both hands though mainly his right. It feels real stiff in the mornings. Started at least 1.5 years ago. ), and Nail Problem (Left foot toe nail has a fungus he would like you to take a look at.)    Subjective          History of Present Illness  David Scott presents to [facility]  To follow up on hyperlipidemia, hypertension, vitamin d deficiency, and afib.    Dr. Kat-every 3 months.    Left foot toenails-possible fungus. He has noticed that for awhile.    He denies any chest pain, soa, palpitations, problems with bowel/bladder fx.         Current Outpatient Medications:   •  Eliquis 5 MG tablet tablet, Take 5 mg by mouth 2 (Two) Times a Day., Disp: , Rfl:   •  losartan-hydrochlorothiazide (HYZAAR) 50-12.5 MG per tablet, Take 0.5 tablets by mouth Daily., Disp: , Rfl:   •  metoprolol tartrate (LOPRESSOR) 50 MG tablet, Take 50 mg by mouth 2 (Two) Times a Day., Disp: , Rfl:   •  pravastatin (PRAVACHOL) 40 MG tablet, Daily., Disp: , Rfl:   •  vitamin D (ERGOCALCIFEROL) 1.25 MG (03802 UT) capsule capsule, Take 1 capsule by mouth 1 (One) Time Per Week., Disp: 12 capsule, Rfl: 1  •  terbinafine (LamISIL) 250 MG tablet, Take 1 tablet by mouth Daily for 84 days., Disp: 42 tablet, Rfl: 1    Objective   Vital Signs:   /88 (BP Location: Left arm, Patient Position: Sitting, Cuff Size: Large Adult)   Pulse 79   Temp 98.2 °F (36.8 °C) (Temporal)   Resp 20   Ht 182.9 cm (72\")   Wt 119 kg (262 lb)   SpO2 97%   BMI 35.53 kg/m²     Physical Exam  Constitutional:       Appearance: He is obese.   HENT:      Head: Normocephalic and atraumatic.   Eyes:      Extraocular Movements: Extraocular movements intact.      Conjunctiva/sclera: Conjunctivae normal.   Cardiovascular:      Rate and Rhythm: Normal rate. Rhythm irregular.      Pulses: Normal pulses.      Heart sounds: Normal " heart sounds.   Pulmonary:      Effort: Pulmonary effort is normal.      Breath sounds: Normal breath sounds.   Abdominal:      General: Bowel sounds are normal. There is no distension.      Palpations: Abdomen is soft. There is no mass.      Tenderness: There is no abdominal tenderness. There is no right CVA tenderness, left CVA tenderness, guarding or rebound.      Hernia: No hernia is present.   Musculoskeletal:         General: Normal range of motion.      Cervical back: Normal range of motion.      Right lower leg: Edema (trace) present.      Left lower leg: Edema (trace) present.   Feet:      Left foot:      Toenail Condition: Fungal disease present.  Skin:     General: Skin is warm and dry.   Neurological:      Mental Status: He is alert and oriented to person, place, and time.   Psychiatric:         Mood and Affect: Mood normal.         Behavior: Behavior normal.         Thought Content: Thought content normal.         Judgment: Judgment normal.        Result Review :   The following data was reviewed by: NORA Harley on 04/11/2022:  CMP    CMP 7/8/21 11/12/21 4/4/22   Glucose 91 95 96   BUN 11 14 10   Creatinine 1.04 1.04 0.92   eGFR Non African Am 72 72    Sodium 140 139 139   Potassium 4.1 4.0 4.1   Chloride 102 102 102   Calcium 9.3 9.4 9.3   Albumin 4.10 4.40 4.10   Total Bilirubin 1.1 1.0 0.9   Alkaline Phosphatase 81 90 79   AST (SGOT) 13 21 13   ALT (SGPT) 15 19 17           CBC w/diff    CBC w/Diff 5/19/21 11/12/21 4/4/22   WBC 7.55 7.78 7.68   RBC 4.88 5.10 5.12   Hemoglobin 14.3 15.1 15.3   Hematocrit 41.8 (A) 43.7 43.1   MCV 85.7 85.7 84.2   MCH 29.3 29.6 29.9   MCHC 34.2 34.6 35.5   RDW 12.4 12.6 12.6   Platelets 225 249 225   Neutrophil Rel % 58.3 55.1 54.2   Immature Granulocyte Rel %  0.1 0.3   Lymphocyte Rel % 29.5 33.0 33.9   Monocyte Rel % 7.3 6.7 7.2   Eosinophil Rel % 4.0 4.2 3.4   Basophil Rel % 0.8 0.9 1.0   (A) Abnormal value            Lipid Panel    Lipid Panel 7/8/21 11/12/21  4/4/22   Total Cholesterol 115 141 115   Triglycerides 108 111 101   HDL Cholesterol 30 (A) 31 (A) 33 (A)   VLDL Cholesterol 20 21 19   LDL Cholesterol  65 89 63   LDL/HDL Ratio 2.11 2.83 1.87   (A) Abnormal value            TSH    TSH 5/19/21 11/12/21 4/4/22   TSH 3.760 2.970 2.850                     Assessment and Plan    Diagnoses and all orders for this visit:    1. Primary hypertension (Primary)  Assessment & Plan:  Hypertension is stable. BP is 132/88 today in the office.  Patient is on hyzaar 100-12.5 mg daily, tolerating well.  Plan: continue current regimen.  UA did have some protein. Pt to keep log for a month and we will recheck labs.     Orders:  -     Comprehensive metabolic panel; Future  -     CBC w AUTO Differential; Future  -     TSH+Free T4; Future  -     Comprehensive metabolic panel; Future    2. Mixed hyperlipidemia  Assessment & Plan:  Lipids are very well controlled.  Patient is on pravastatin, tolerating well.  Plan: Continue current regimen.    Orders:  -     Lipid panel; Future  -     TSH+Free T4; Future  -     Comprehensive metabolic panel; Future    3. Paroxysmal atrial fibrillation (HCC)  Assessment & Plan:  History of afib. He is rate controlled with lopressor 50 mg twice daily.  Anticoagulated with eliquis 5 mg BID.  Pt continues with Dr. Kat q 3 months.  No recent events.  Denies any palpitations, soa or chest pain.   Plan: Continue current regimen, follow with Dr. Kat.      Orders:  -     TSH+Free T4; Future    4. Vitamin D deficiency  Assessment & Plan:  Vitamin D is stable. Vit D is 36.4.  He has been taking Vit D 50,000 units weekly.  Plan: continue    Orders:  -     Vitamin D 25 hydroxy; Future  -     TSH+Free T4; Future    5. Need for hepatitis C screening test  -     Hepatitis C antibody; Future    6. Elevated prostate specific antigen (PSA)  Assessment & Plan:  Elevated PSA.  MRI of Prostate-BPH, no suspicion for malignancy  Followed by urology, PSA ordered for  Sept          7. Proteinuria, unspecified type  -     Urinalysis With Culture If Indicated -; Future    8. Onychomycosis  Assessment & Plan:  Left foot, multiple toe nails.  Pt has had treatment years ago for this and he tolerated well.  Plan: Start lamisil. Monitor labs.      9. Benign prostatic hyperplasia without lower urinary tract symptoms    10. Class 2 obesity due to excess calories with body mass index (BMI) of 35.0 to 35.9 in adult, unspecified whether serious comorbidity present  Assessment & Plan:  Patient's (Body mass index is 35.53 kg/m².) indicates that they are morbidly obese (BMI > 40 or > 35 with obesity - related health condition) with health conditions that include hypertension and dyslipidemias . Weight is unchanged.   Patient encouraged to start exercising. He admits to not being active.  Increase activity as tolerated.      Other orders  -     terbinafine (LamISIL) 250 MG tablet; Take 1 tablet by mouth Daily for 84 days.  Dispense: 42 tablet; Refill: 1      Follow Up   Return in about 6 months (around 10/11/2022).  Patient was given instructions and counseling regarding his condition or for health maintenance advice. Please see specific information pulled into the AVS if appropriate.

## 2022-05-13 ENCOUNTER — LAB (OUTPATIENT)
Dept: LAB | Facility: HOSPITAL | Age: 67
End: 2022-05-13

## 2022-05-13 DIAGNOSIS — I10 PRIMARY HYPERTENSION: ICD-10-CM

## 2022-05-13 DIAGNOSIS — R80.9 PROTEINURIA, UNSPECIFIED TYPE: ICD-10-CM

## 2022-05-13 DIAGNOSIS — E78.2 MIXED HYPERLIPIDEMIA: ICD-10-CM

## 2022-05-13 LAB
ALBUMIN SERPL-MCNC: 4.2 G/DL (ref 3.5–5.2)
ALBUMIN/GLOB SERPL: 1.5 G/DL
ALP SERPL-CCNC: 74 U/L (ref 39–117)
ALT SERPL W P-5'-P-CCNC: 15 U/L (ref 1–41)
ANION GAP SERPL CALCULATED.3IONS-SCNC: 11 MMOL/L (ref 5–15)
AST SERPL-CCNC: 16 U/L (ref 1–40)
BILIRUB SERPL-MCNC: 0.7 MG/DL (ref 0–1.2)
BILIRUB UR QL STRIP: NEGATIVE
BUN SERPL-MCNC: 10 MG/DL (ref 8–23)
BUN/CREAT SERPL: 11.5 (ref 7–25)
CALCIUM SPEC-SCNC: 9.2 MG/DL (ref 8.6–10.5)
CHLORIDE SERPL-SCNC: 101 MMOL/L (ref 98–107)
CLARITY UR: CLEAR
CO2 SERPL-SCNC: 26 MMOL/L (ref 22–29)
COLOR UR: YELLOW
CREAT SERPL-MCNC: 0.87 MG/DL (ref 0.76–1.27)
EGFRCR SERPLBLD CKD-EPI 2021: 95.8 ML/MIN/1.73
GLOBULIN UR ELPH-MCNC: 2.8 GM/DL
GLUCOSE SERPL-MCNC: 86 MG/DL (ref 65–99)
GLUCOSE UR STRIP-MCNC: NEGATIVE MG/DL
HGB UR QL STRIP.AUTO: NEGATIVE
KETONES UR QL STRIP: NEGATIVE
LEUKOCYTE ESTERASE UR QL STRIP.AUTO: NEGATIVE
NITRITE UR QL STRIP: NEGATIVE
PH UR STRIP.AUTO: 7.5 [PH] (ref 5–8)
POTASSIUM SERPL-SCNC: 3.9 MMOL/L (ref 3.5–5.2)
PROT SERPL-MCNC: 7 G/DL (ref 6–8.5)
PROT UR QL STRIP: NEGATIVE
SODIUM SERPL-SCNC: 138 MMOL/L (ref 136–145)
SP GR UR STRIP: 1.01 (ref 1–1.03)
UROBILINOGEN UR QL STRIP: NORMAL

## 2022-05-13 PROCEDURE — 81003 URINALYSIS AUTO W/O SCOPE: CPT

## 2022-05-13 PROCEDURE — 80053 COMPREHEN METABOLIC PANEL: CPT

## 2022-05-13 PROCEDURE — 36415 COLL VENOUS BLD VENIPUNCTURE: CPT

## 2022-08-04 ENCOUNTER — LAB (OUTPATIENT)
Dept: LAB | Facility: HOSPITAL | Age: 67
End: 2022-08-04

## 2022-08-04 ENCOUNTER — TRANSCRIBE ORDERS (OUTPATIENT)
Dept: LAB | Facility: HOSPITAL | Age: 67
End: 2022-08-04

## 2022-08-04 DIAGNOSIS — I48.91 ATRIAL FIBRILLATION, UNSPECIFIED TYPE: Primary | ICD-10-CM

## 2022-08-04 DIAGNOSIS — I10 HYPERTENSION, UNSPECIFIED TYPE: ICD-10-CM

## 2022-08-04 DIAGNOSIS — E11.9 DIABETES MELLITUS WITHOUT COMPLICATION: ICD-10-CM

## 2022-08-04 DIAGNOSIS — Z12.5 PROSTATE CANCER SCREENING: ICD-10-CM

## 2022-08-04 DIAGNOSIS — I48.91 ATRIAL FIBRILLATION, UNSPECIFIED TYPE: ICD-10-CM

## 2022-08-04 LAB
ANION GAP SERPL CALCULATED.3IONS-SCNC: 9.8 MMOL/L (ref 5–15)
BUN SERPL-MCNC: 9 MG/DL (ref 8–23)
BUN/CREAT SERPL: 10.3 (ref 7–25)
CALCIUM SPEC-SCNC: 9.3 MG/DL (ref 8.6–10.5)
CHLORIDE SERPL-SCNC: 103 MMOL/L (ref 98–107)
CO2 SERPL-SCNC: 28.2 MMOL/L (ref 22–29)
CREAT SERPL-MCNC: 0.87 MG/DL (ref 0.76–1.27)
EGFRCR SERPLBLD CKD-EPI 2021: 95.8 ML/MIN/1.73
GLUCOSE SERPL-MCNC: 83 MG/DL (ref 65–99)
HBA1C MFR BLD: 5.5 % (ref 4.8–5.6)
POTASSIUM SERPL-SCNC: 4.3 MMOL/L (ref 3.5–5.2)
PSA SERPL-MCNC: 6.36 NG/ML (ref 0–4)
SODIUM SERPL-SCNC: 141 MMOL/L (ref 136–145)

## 2022-08-04 PROCEDURE — 83036 HEMOGLOBIN GLYCOSYLATED A1C: CPT

## 2022-08-04 PROCEDURE — G0103 PSA SCREENING: HCPCS

## 2022-08-04 PROCEDURE — 80048 BASIC METABOLIC PNL TOTAL CA: CPT

## 2022-08-04 PROCEDURE — 36415 COLL VENOUS BLD VENIPUNCTURE: CPT

## 2022-09-21 ENCOUNTER — TELEPHONE (OUTPATIENT)
Dept: UROLOGY | Facility: CLINIC | Age: 67
End: 2022-09-21

## 2022-09-21 NOTE — TELEPHONE ENCOUNTER
I spoke to pt.  I explained we will not need  A new PSA done prior to his appt.on 9/29/22.  Pt is to keep that appt.  Pt is of understanding.

## 2022-09-21 NOTE — TELEPHONE ENCOUNTER
RECEIVED A CALL FROM PT. PT HAS COMPLETED PSA BLOOD WORK ON 8/4/22. DOES HE NEED ANOTHER PSA PRIOR TO APPT ON 9/29/22?

## 2022-09-29 ENCOUNTER — LAB (OUTPATIENT)
Dept: LAB | Facility: HOSPITAL | Age: 67
End: 2022-09-29

## 2022-09-29 ENCOUNTER — OFFICE VISIT (OUTPATIENT)
Dept: UROLOGY | Facility: CLINIC | Age: 67
End: 2022-09-29

## 2022-09-29 VITALS — BODY MASS INDEX: 35.08 KG/M2 | WEIGHT: 259 LBS | RESPIRATION RATE: 14 BRPM | HEIGHT: 72 IN

## 2022-09-29 DIAGNOSIS — N40.0 BENIGN PROSTATIC HYPERPLASIA WITHOUT LOWER URINARY TRACT SYMPTOMS: Primary | ICD-10-CM

## 2022-09-29 DIAGNOSIS — I10 PRIMARY HYPERTENSION: ICD-10-CM

## 2022-09-29 DIAGNOSIS — E55.9 VITAMIN D DEFICIENCY: ICD-10-CM

## 2022-09-29 DIAGNOSIS — I48.0 PAROXYSMAL ATRIAL FIBRILLATION: ICD-10-CM

## 2022-09-29 DIAGNOSIS — R97.20 ELEVATED PROSTATE SPECIFIC ANTIGEN (PSA): ICD-10-CM

## 2022-09-29 DIAGNOSIS — N40.2 PROSTATE NODULE: ICD-10-CM

## 2022-09-29 DIAGNOSIS — Z11.59 NEED FOR HEPATITIS C SCREENING TEST: ICD-10-CM

## 2022-09-29 DIAGNOSIS — E78.2 MIXED HYPERLIPIDEMIA: ICD-10-CM

## 2022-09-29 LAB
25(OH)D3 SERPL-MCNC: 33.2 NG/ML (ref 30–100)
ALBUMIN SERPL-MCNC: 4.4 G/DL (ref 3.5–5.2)
ALBUMIN/GLOB SERPL: 1.6 G/DL
ALP SERPL-CCNC: 88 U/L (ref 39–117)
ALT SERPL W P-5'-P-CCNC: 14 U/L (ref 1–41)
ANION GAP SERPL CALCULATED.3IONS-SCNC: 8 MMOL/L (ref 5–15)
AST SERPL-CCNC: 18 U/L (ref 1–40)
BASOPHILS # BLD AUTO: 0.09 10*3/MM3 (ref 0–0.2)
BASOPHILS NFR BLD AUTO: 1 % (ref 0–1.5)
BILIRUB SERPL-MCNC: 1 MG/DL (ref 0–1.2)
BUN SERPL-MCNC: 11 MG/DL (ref 8–23)
BUN/CREAT SERPL: 13.9 (ref 7–25)
CALCIUM SPEC-SCNC: 9.5 MG/DL (ref 8.6–10.5)
CHLORIDE SERPL-SCNC: 101 MMOL/L (ref 98–107)
CHOLEST SERPL-MCNC: 117 MG/DL (ref 0–200)
CO2 SERPL-SCNC: 30 MMOL/L (ref 22–29)
CREAT SERPL-MCNC: 0.79 MG/DL (ref 0.76–1.27)
DEPRECATED RDW RBC AUTO: 38.8 FL (ref 37–54)
EGFRCR SERPLBLD CKD-EPI 2021: 98.6 ML/MIN/1.73
EOSINOPHIL # BLD AUTO: 0.33 10*3/MM3 (ref 0–0.4)
EOSINOPHIL NFR BLD AUTO: 3.7 % (ref 0.3–6.2)
ERYTHROCYTE [DISTWIDTH] IN BLOOD BY AUTOMATED COUNT: 12.5 % (ref 12.3–15.4)
GLOBULIN UR ELPH-MCNC: 2.7 GM/DL
GLUCOSE SERPL-MCNC: 100 MG/DL (ref 65–99)
HCT VFR BLD AUTO: 42.6 % (ref 37.5–51)
HCV AB SER DONR QL: NORMAL
HDLC SERPL-MCNC: 33 MG/DL (ref 40–60)
HGB BLD-MCNC: 15.2 G/DL (ref 13–17.7)
IMM GRANULOCYTES # BLD AUTO: 0.02 10*3/MM3 (ref 0–0.05)
IMM GRANULOCYTES NFR BLD AUTO: 0.2 % (ref 0–0.5)
LDLC SERPL CALC-MCNC: 66 MG/DL (ref 0–100)
LDLC/HDLC SERPL: 1.96 {RATIO}
LYMPHOCYTES # BLD AUTO: 2.57 10*3/MM3 (ref 0.7–3.1)
LYMPHOCYTES NFR BLD AUTO: 28.6 % (ref 19.6–45.3)
MCH RBC QN AUTO: 30.3 PG (ref 26.6–33)
MCHC RBC AUTO-ENTMCNC: 35.7 G/DL (ref 31.5–35.7)
MCV RBC AUTO: 84.9 FL (ref 79–97)
MONOCYTES # BLD AUTO: 0.58 10*3/MM3 (ref 0.1–0.9)
MONOCYTES NFR BLD AUTO: 6.4 % (ref 5–12)
NEUTROPHILS NFR BLD AUTO: 5.41 10*3/MM3 (ref 1.7–7)
NEUTROPHILS NFR BLD AUTO: 60.1 % (ref 42.7–76)
NRBC BLD AUTO-RTO: 0 /100 WBC (ref 0–0.2)
PLATELET # BLD AUTO: 247 10*3/MM3 (ref 140–450)
PMV BLD AUTO: 10.2 FL (ref 6–12)
POTASSIUM SERPL-SCNC: 4.1 MMOL/L (ref 3.5–5.2)
PROT SERPL-MCNC: 7.1 G/DL (ref 6–8.5)
RBC # BLD AUTO: 5.02 10*6/MM3 (ref 4.14–5.8)
SODIUM SERPL-SCNC: 139 MMOL/L (ref 136–145)
T4 FREE SERPL-MCNC: 1.36 NG/DL (ref 0.93–1.7)
TRIGL SERPL-MCNC: 96 MG/DL (ref 0–150)
TSH SERPL DL<=0.05 MIU/L-ACNC: 3.74 UIU/ML (ref 0.27–4.2)
URINE VOLUME: 18
VLDLC SERPL-MCNC: 18 MG/DL (ref 5–40)
WBC NRBC COR # BLD: 9 10*3/MM3 (ref 3.4–10.8)

## 2022-09-29 PROCEDURE — 84443 ASSAY THYROID STIM HORMONE: CPT

## 2022-09-29 PROCEDURE — 99213 OFFICE O/P EST LOW 20 MIN: CPT | Performed by: NURSE PRACTITIONER

## 2022-09-29 PROCEDURE — 85025 COMPLETE CBC W/AUTO DIFF WBC: CPT

## 2022-09-29 PROCEDURE — 82306 VITAMIN D 25 HYDROXY: CPT

## 2022-09-29 PROCEDURE — 86803 HEPATITIS C AB TEST: CPT

## 2022-09-29 PROCEDURE — 36415 COLL VENOUS BLD VENIPUNCTURE: CPT

## 2022-09-29 PROCEDURE — 80053 COMPREHEN METABOLIC PANEL: CPT

## 2022-09-29 PROCEDURE — 80061 LIPID PANEL: CPT

## 2022-09-29 PROCEDURE — 51798 US URINE CAPACITY MEASURE: CPT | Performed by: NURSE PRACTITIONER

## 2022-09-29 PROCEDURE — 84439 ASSAY OF FREE THYROXINE: CPT

## 2022-09-29 RX ORDER — LOSARTAN POTASSIUM 50 MG/1
50 TABLET ORAL DAILY
COMMUNITY
Start: 2022-09-14 | End: 2023-01-10 | Stop reason: SDUPTHER

## 2022-09-29 NOTE — PROGRESS NOTES
"Chief Complaint: Elevated PSA    Subjective         History of Present Illness  David Scott is a 66 y.o. male presents to [facility] to be seen for f/u on elevated PSA.      PSA on 8/4/22 is 6.3    No urgency or frequency with urination.     Nocturia x 1-2    No bothersome voiding symptoms.    Previous:    His PSA previously was 5.58 and is now 5.4.    MRI negative in 9/2021 108gm prostate    He states he is asymptomatic now.    Nocturia x 0-2    He denies straining to void.    No post void dribble.      No perineal pain.     Objective     Past Medical History:   Diagnosis Date   • Chronic allergic rhinitis    • High blood pressure    • Personal history of colonic polyps 01/15/2016       Past Surgical History:   Procedure Laterality Date   • COLONOSCOPY           Current Outpatient Medications:   •  Eliquis 5 MG tablet tablet, Take 5 mg by mouth 2 (Two) Times a Day., Disp: , Rfl:   •  losartan (COZAAR) 50 MG tablet, Take 50 mg by mouth Daily., Disp: , Rfl:   •  metoprolol tartrate (LOPRESSOR) 50 MG tablet, Take 50 mg by mouth 2 (Two) Times a Day., Disp: , Rfl:   •  pravastatin (PRAVACHOL) 40 MG tablet, Daily., Disp: , Rfl:   •  losartan-hydrochlorothiazide (HYZAAR) 50-12.5 MG per tablet, Take 0.5 tablets by mouth Daily., Disp: , Rfl:   •  vitamin D (ERGOCALCIFEROL) 1.25 MG (03460 UT) capsule capsule, Take 1 capsule by mouth 1 (One) Time Per Week., Disp: 12 capsule, Rfl: 1    No Known Allergies     Family History   Problem Relation Age of Onset   • Prostate cancer Brother    • Urolithiasis Brother        Social History     Socioeconomic History   • Marital status: Single   Tobacco Use   • Smoking status: Never Smoker   • Smokeless tobacco: Never Used   Vaping Use   • Vaping Use: Never used   Substance and Sexual Activity   • Alcohol use: Yes     Comment: light drinker   • Drug use: Never   • Sexual activity: Defer       Vital Signs:   Resp 14   Ht 182.9 cm (72\")   Wt 117 kg (259 lb)   BMI " 35.13 kg/m²           Result Review :   The following data was reviewed by: NORA Chang on 08/26/2021:  Results for orders placed or performed in visit on 09/29/22   Bladder Scan   Result Value Ref Range    Urine Volume 18       PSA    PSA 3/23/22 8/4/22   PSA 5.470 (A) 6.360 (A)   (A) Abnormal value          Bladder Scan interpretation 09/29/2022    Estimation of residual urine via BVI 3000 [address]athon Bladder Scan  MA/nurse performing: [place] Jimenez MA   Residual Urine: 18 ml  Indication: Benign prostatic hyperplasia without lower urinary tract symptoms    Elevated prostate specific antigen (PSA)    Prostate nodule   Position: Supine  Examination: Incremental scanning of the suprapubic area using 2.0 MHz transducer using copious amounts of acoustic gel.   Findings: An anechoic area was demonstrated which represented the bladder, with measurement of residual urine as noted. I inspected this myself. In that the residual urine was insignificant, refer to plan for treatment and plan necessary at this time.           Procedures        Assessment and Plan    Diagnoses and all orders for this visit:    1. Benign prostatic hyperplasia without lower urinary tract symptoms (Primary)  -     Bladder Scan    2. Elevated prostate specific antigen (PSA)  -     Bladder Scan    3. Prostate nodule  -     Bladder Scan      Discussed with patient at this time as his PSA is slightly more increased than last check I would like to repeat PSA 1 more time in 6 months to ensure it stays below 10.  Did discuss with the patient that his significantly enlarged prostate noted on MRI last year still gives him a PSA density of 0.05 which coincides with a very low risk of having prostate cancer.      I spent 10 minutes caring for David on this date of service. This time includes time spent by me in the following activities:reviewing tests, obtaining and/or reviewing a separately obtained history, performing a medically appropriate  examination and/or evaluation , counseling and educating the patient/family/caregiver, ordering medications, tests, or procedures, and documenting information in the medical record  Follow Up   No follow-ups on file.  Patient was given instructions and counseling regarding his condition or for health maintenance advice. Please see specific information pulled into the AVS if appropriate.         This document has been electronically signed by NORA Chang  September 29, 2022 09:48 EDT

## 2022-10-11 ENCOUNTER — OFFICE VISIT (OUTPATIENT)
Dept: INTERNAL MEDICINE | Facility: CLINIC | Age: 67
End: 2022-10-11

## 2022-10-11 VITALS
OXYGEN SATURATION: 100 % | TEMPERATURE: 96.9 F | HEART RATE: 76 BPM | RESPIRATION RATE: 18 BRPM | WEIGHT: 258.8 LBS | DIASTOLIC BLOOD PRESSURE: 87 MMHG | BODY MASS INDEX: 35.05 KG/M2 | HEIGHT: 72 IN | SYSTOLIC BLOOD PRESSURE: 131 MMHG

## 2022-10-11 DIAGNOSIS — Z23 NEED FOR VACCINATION: ICD-10-CM

## 2022-10-11 DIAGNOSIS — I10 PRIMARY HYPERTENSION: ICD-10-CM

## 2022-10-11 DIAGNOSIS — E55.9 VITAMIN D DEFICIENCY: ICD-10-CM

## 2022-10-11 DIAGNOSIS — Z23 NEED FOR INFLUENZA VACCINATION: ICD-10-CM

## 2022-10-11 DIAGNOSIS — I48.0 PAROXYSMAL ATRIAL FIBRILLATION: ICD-10-CM

## 2022-10-11 DIAGNOSIS — Z00.00 MEDICARE ANNUAL WELLNESS VISIT, INITIAL: Primary | ICD-10-CM

## 2022-10-11 DIAGNOSIS — E78.2 MIXED HYPERLIPIDEMIA: ICD-10-CM

## 2022-10-11 DIAGNOSIS — R73.01 IMPAIRED FASTING GLUCOSE: ICD-10-CM

## 2022-10-11 PROCEDURE — 90662 IIV NO PRSV INCREASED AG IM: CPT

## 2022-10-11 PROCEDURE — 90471 IMMUNIZATION ADMIN: CPT

## 2022-10-11 PROCEDURE — G0008 ADMIN INFLUENZA VIRUS VAC: HCPCS

## 2022-10-11 PROCEDURE — 1159F MED LIST DOCD IN RCRD: CPT

## 2022-10-11 PROCEDURE — 1170F FXNL STATUS ASSESSED: CPT

## 2022-10-11 PROCEDURE — G0438 PPPS, INITIAL VISIT: HCPCS

## 2022-10-11 PROCEDURE — 90715 TDAP VACCINE 7 YRS/> IM: CPT

## 2022-10-11 NOTE — ASSESSMENT & PLAN NOTE
Hypertension is stable.  Patient is followed by dr. Kat.  He is on losartan 50 mg daily and toprol 50 mg bid.  Plan: Continue current regimen.   18

## 2022-10-11 NOTE — PROGRESS NOTES
The ABCs of the Annual Wellness Visit  Initial Medicare Wellness Visit    Chief Complaint   Patient presents with   • Follow-up     Pt states he is being seen for a follow up and he is doing good overall.    • Medicare Wellness-Initial Visit     Subjective   History of Present Illness:  David Scott is a 67 y.o. male who presents for an Initial Medicare Wellness Visit.    The following portions of the patient's history were reviewed and   updated as appropriate: allergies, current medications, past family history, past medical history, past social history, past surgical history and problem list.     Compared to one year ago, the patient feels his physical   health is the same.    Compared to one year ago, the patient feels his mental   health is the same.    Recent Hospitalizations:  He was not admitted to the hospital during the last year.       Current Medical Providers:  Patient Care Team:  Jelena Mcfarlane APRN as PCP - General (Internal Medicine)  Alisia Cabral APRN as Nurse Practitioner (Nurse Practitioner)    Outpatient Medications Prior to Visit   Medication Sig Dispense Refill   • Eliquis 5 MG tablet tablet Take 5 mg by mouth 2 (Two) Times a Day.     • losartan (COZAAR) 50 MG tablet Take 50 mg by mouth Daily.     • metoprolol tartrate (LOPRESSOR) 50 MG tablet Take 50 mg by mouth 2 (Two) Times a Day.     • pravastatin (PRAVACHOL) 40 MG tablet Daily.     • losartan-hydrochlorothiazide (HYZAAR) 50-12.5 MG per tablet Take 0.5 tablets by mouth Daily.     • vitamin D (ERGOCALCIFEROL) 1.25 MG (21775 UT) capsule capsule Take 1 capsule by mouth 1 (One) Time Per Week. 12 capsule 1     No facility-administered medications prior to visit.       No opioid medication identified on active medication list. I have reviewed chart for other potential  high risk medication/s and harmful drug interactions in the elderly.          Aspirin is not on active medication list.  Aspirin use is not indicated based on review of current  "medical condition/s. Risk of harm outweighs potential benefits.  .    Patient Active Problem List   Diagnosis   • Colon cancer screening   • Allergic rhinitis   • High blood pressure   • History of colonic polyps   • Hyperlipidemia   • Vitamin D deficiency   • Elevated prostate specific antigen (PSA)   • Prostate nodule   • Paroxysmal atrial fibrillation (HCC)   • Cardiomegaly   • Long term (current) use of anticoagulants   • Metabolic syndrome   • Mitral valve disorder   • Systolic murmur   • Benign prostatic hyperplasia without lower urinary tract symptoms   • Onychomycosis   • Class 2 obesity due to excess calories with body mass index (BMI) of 35.0 to 35.9 in adult     Advance Care Planning  Advance Directive is on file.  ACP discussion was held with the patient during this visit. Patient has an advance directive in EMR which is still valid.           Objective       Vitals:    10/11/22 0755   BP: 131/87   BP Location: Right arm   Pulse: 76   Resp: 18   Temp: 96.9 °F (36.1 °C)   TempSrc: Temporal   SpO2: 100%   Weight: 117 kg (258 lb 12.8 oz)   Height: 182.9 cm (72.01\")     Estimated body mass index is 35.09 kg/m² as calculated from the following:    Height as of this encounter: 182.9 cm (72.01\").    Weight as of this encounter: 117 kg (258 lb 12.8 oz).    Class 2 Severe Obesity (BMI >=35 and <=39.9). Obesity-related health conditions include the following: hypertension and dyslipidemias. Obesity is unchanged. BMI is is above average; BMI management plan is completed. We discussed portion control and increasing exercise.      Does the patient have evidence of cognitive impairment? No    Physical Exam  Vitals and nursing note reviewed.   Constitutional:       Appearance: Normal appearance. He is obese.   HENT:      Head: Normocephalic and atraumatic.   Eyes:      Extraocular Movements: Extraocular movements intact.      Conjunctiva/sclera: Conjunctivae normal.   Cardiovascular:      Rate and Rhythm: Normal rate " and regular rhythm.      Heart sounds: Murmur heard.   Pulmonary:      Effort: Pulmonary effort is normal.      Breath sounds: Normal breath sounds. No wheezing or rales.   Abdominal:      General: Bowel sounds are normal.      Palpations: Abdomen is soft.      Tenderness: There is no abdominal tenderness. There is no guarding.   Musculoskeletal:         General: No swelling. Normal range of motion.   Skin:     General: Skin is warm and dry.   Neurological:      General: No focal deficit present.      Mental Status: He is alert and oriented to person, place, and time. Mental status is at baseline.   Psychiatric:         Mood and Affect: Mood normal.         Behavior: Behavior normal.         Thought Content: Thought content normal.         Judgment: Judgment normal.       Lab Results   Component Value Date    TRIG 96 09/29/2022    HDL 33 (L) 09/29/2022    LDL 66 09/29/2022    VLDL 18 09/29/2022    HGBA1C 5.50 08/04/2022          HEALTH RISK ASSESSMENT    Smoking Status:  Social History     Tobacco Use   Smoking Status Never   Smokeless Tobacco Never     Alcohol Consumption:  Social History     Substance and Sexual Activity   Alcohol Use Yes    Comment: light drinker     Fall Risk Screen:    [facility] Fall Risk Assessment was completed, and patient is at LOW risk for falls.Assessment completed on:4/11/2022    Depression Screen:   PHQ-2/PHQ-9 Depression Screening 10/11/2022   Little Interest or Pleasure in Doing Things 0-->not at all   Feeling Down, Depressed or Hopeless 0-->not at all   Trouble Falling or Staying Asleep, or Sleeping Too Much -   Feeling Tired or Having Little Energy -   Poor Appetite or Overeating -   Feeling Bad about Yourself - or that You are a Failure or Have Let Yourself or Your Family Down -   Trouble Concentrating on Things, Such as Reading the Newspaper or Watching Television -   Moving or Speaking So Slowly that Other People Could Have Noticed? Or the Opposite - Being So Fidgety -   Thoughts  that You Would be Better Off Dead or of Hurting Yourself in Some Way -   PHQ-9: Brief Depression Severity Measure Score 0   If You Checked Off Any Problems, How Difficult Have These Problems Made It For You to Do Your Work, Take Care of Things at Home, or Get Along with Other People? -       Health Habits and Functional and Cognitive Screening:  No flowsheet data found.    Age-appropriate Screening Schedule:  Refer to the list below for future screening recommendations based on patient's age, sex and/or medical conditions. Orders for these recommended tests are listed in the plan section. The patient has been provided with a written plan.    Health Maintenance   Topic Date Due   • TDAP/TD VACCINES (1 - Tdap) Never done   • ZOSTER VACCINE (1 of 2) Never done   • DIABETIC FOOT EXAM  Never done   • INFLUENZA VACCINE  08/01/2022   • URINE MICROALBUMIN  10/29/2022 (Originally 1955)   • HEMOGLOBIN A1C  02/04/2023   • DIABETIC EYE EXAM  08/01/2023   • LIPID PANEL  09/29/2023            Assessment & Plan   CMS Preventative Services Quick Reference  Risk Factors Identified During Encounter  Immunizations Discussed/Encouraged (specific Immunizations; Tdap, Influenza, Shingrix and COVID19  Inactivity/Sedentary  Obesity/Overweight   The above risks/problems have been discussed with the patient.  Follow up actions/plans if indicated are seen below in the Assessment/Plan Section.  Pertinent information has been shared with the patient in the After Visit Summary.  TDAP-Today  Flu-Today  COVID booster-counseled  shingrex- counseled.  Eye exam-pt states he had eye exam a couple months ago  Encouraged patient to increase activity as tolerated.    Diagnoses and all orders for this visit:    1. Medicare annual wellness visit, initial (Primary)    2. Mixed hyperlipidemia  Assessment & Plan:  Very well controllled. Continue current pravastatin.    Orders:  -     CBC & Differential; Future  -     Comprehensive Metabolic Panel;  Future  -     Lipid Panel; Future  -     TSH; Future  -     Urinalysis With Microscopic - Urine, Clean Catch; Future    3. Paroxysmal atrial fibrillation (HCC)  Assessment & Plan:  Hx of afib. Denies any palpitations, chest pain or soa.  He is on eliquis 5 mg bid and toprol 50 mg bid.  Plan: continue current regimen.        4. Primary hypertension  Assessment & Plan:  Hypertension is stable.  Patient is followed by dr. Kat.  He is on losartan 50 mg daily and toprol 50 mg bid.  Plan: Continue current regimen.    Orders:  -     CBC & Differential; Future  -     Comprehensive Metabolic Panel; Future  -     Lipid Panel; Future  -     TSH; Future  -     Urinalysis With Microscopic - Urine, Clean Catch; Future    5. Vitamin D deficiency  Assessment & Plan:  Low end normal.  Pt is not on any supplementation.  Plan: encouraged pt to take Vit D 1000 units daily.     Orders:  -     Vitamin D 25 Hydroxy; Future    6. Impaired fasting glucose  -     Hemoglobin A1c; Future      Follow Up:  Return in about 6 months (around 4/11/2023).     An After Visit Summary and PPPS were made available to the patient.

## 2022-10-11 NOTE — ASSESSMENT & PLAN NOTE
Hx of afib. Denies any palpitations, chest pain or soa.  He is on eliquis 5 mg bid and toprol 50 mg bid.  Plan: continue current regimen.

## 2022-10-11 NOTE — ASSESSMENT & PLAN NOTE
Low end normal.  Pt is not on any supplementation.  Plan: encouraged pt to take Vit D 1000 units daily.

## 2022-11-02 ENCOUNTER — CLINICAL SUPPORT (OUTPATIENT)
Dept: INTERNAL MEDICINE | Facility: CLINIC | Age: 67
End: 2022-11-02

## 2022-11-02 DIAGNOSIS — Z23 NEED FOR COVID-19 VACCINE: Primary | ICD-10-CM

## 2022-11-02 PROCEDURE — 0124A PR ADM SARSCOV2 30MCG/0.3ML BST: CPT

## 2022-11-02 PROCEDURE — 91312 COVID-19 (PFIZER) BIVALENT BOOSTER 12+YRS: CPT

## 2023-01-09 ENCOUNTER — TELEPHONE (OUTPATIENT)
Dept: INTERNAL MEDICINE | Facility: CLINIC | Age: 68
End: 2023-01-09

## 2023-01-09 NOTE — TELEPHONE ENCOUNTER
Caller: David Scott    Relationship: Self    Best call back number: [phone]    What is the medical concern/diagnosis: BACK INJURY    What specialty or service is being requested: CYROPRACTOR OR OTHER SPECIALIST RECOMMENDED BY PROVIDER    Any additional details: PATIENT HAS AN INJURY TO HIS BACK, HE IS HAVING PAIN THAT IS GETTING WORSE, HE NEEDS TO KNOW WHAT HE SHOULD DO.  FIRST AVAILABLE APPOINTMENT WITH [facility] IS 01/12/2023 AND HE NEEDS TO BE SEEN BEFORE THEN   PLEASE CALL AND ADVISE

## 2023-01-10 ENCOUNTER — OFFICE VISIT (OUTPATIENT)
Dept: INTERNAL MEDICINE | Facility: CLINIC | Age: 68
End: 2023-01-10
Payer: MEDICARE

## 2023-01-10 ENCOUNTER — HOSPITAL ENCOUNTER (OUTPATIENT)
Dept: GENERAL RADIOLOGY | Facility: HOSPITAL | Age: 68
Discharge: HOME | End: 2023-01-10
Payer: MEDICARE

## 2023-01-10 VITALS
WEIGHT: 259.2 LBS | TEMPERATURE: 97.8 F | HEART RATE: 76 BPM | OXYGEN SATURATION: 98 % | RESPIRATION RATE: 18 BRPM | HEIGHT: 72 IN | SYSTOLIC BLOOD PRESSURE: 168 MMHG | BODY MASS INDEX: 35.11 KG/M2 | DIASTOLIC BLOOD PRESSURE: 98 MMHG

## 2023-01-10 DIAGNOSIS — I10 PRIMARY HYPERTENSION: ICD-10-CM

## 2023-01-10 DIAGNOSIS — M54.16 LUMBAR RADICULOPATHY: ICD-10-CM

## 2023-01-10 DIAGNOSIS — M54.16 LUMBAR RADICULOPATHY: Primary | ICD-10-CM

## 2023-01-10 PROCEDURE — 99214 OFFICE O/P EST MOD 30 MIN: CPT

## 2023-01-10 PROCEDURE — 72110 X-RAY EXAM L-2 SPINE 4/>VWS: CPT

## 2023-01-10 RX ORDER — METHYLPREDNISOLONE 4 MG/1
TABLET ORAL
Qty: 21 EACH | Refills: 0 | Status: SHIPPED | OUTPATIENT
Start: 2023-01-10 | End: 2023-01-15

## 2023-01-10 RX ORDER — LOSARTAN POTASSIUM 100 MG/1
100 TABLET ORAL DAILY
Qty: 90 TABLET | Refills: 1 | Status: SHIPPED | OUTPATIENT
Start: 2023-01-10

## 2023-01-10 RX ORDER — BACLOFEN 10 MG/1
10 TABLET ORAL 3 TIMES DAILY PRN
Qty: 30 TABLET | Refills: 0 | Status: SHIPPED | OUTPATIENT
Start: 2023-01-10 | End: 2023-01-24 | Stop reason: SINTOL

## 2023-01-10 NOTE — PROGRESS NOTES
Chief Complaint  Back Pain (Pt states that he is having a pain in his lower back that is like a stabbing pain that goes down to his left leg. He states that it started Friday that progressively worsened. )    History of Present Illness  SUBJECTIVE  David Scott presents to [facility] with complaints of lower back pain that radiates down his left leg. Pt states this has been going on since Friday. 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 Pt states pain is relieved with sitting/lying. Pt states pain is worse with standing/rom exercise.   Denies any loss of bowel/bladder function.     HTN-140/80.       Past Medical History:   Diagnosis Date   • Chronic allergic rhinitis    • High blood pressure    • Personal history of colonic polyps 01/15/2016      Family History   Problem Relation Age of Onset   • Prostate cancer Brother    • Urolithiasis Brother       Past Surgical History:   Procedure Laterality Date   • COLONOSCOPY          Current Outpatient Medications:   •  Eliquis 5 MG tablet tablet, Take 5 mg by mouth 2 (Two) Times a Day., Disp: , Rfl:   •  losartan (COZAAR) 100 MG tablet, Take 1 tablet by mouth Daily., Disp: 90 tablet, Rfl: 1  •  metoprolol tartrate (LOPRESSOR) 50 MG tablet, Take 50 mg by mouth 2 (Two) Times a Day., Disp: , Rfl:   •  pravastatin (PRAVACHOL) 40 MG tablet, Daily., Disp: , Rfl:   •  baclofen (LIORESAL) 10 MG tablet, Take 1 tablet by mouth 3 (Three) Times a Day As Needed for Muscle Spasms., Disp: 30 tablet, Rfl: 0  •  methylPREDNISolone (MEDROL) 4 MG dose pack, Take as directed on package instructions., Disp: 21 each, Rfl: 0    OBJECTIVE  Vital Signs:   /98   Pulse 76   Temp 97.8 °F (36.6 °C) (Temporal)   Resp 18   Ht 182.9 cm (72.01\")   Wt 118 kg (259 lb 3.2 oz)   SpO2 98%   BMI 35.15 kg/m²    Estimated body mass index is 35.15 kg/m² as calculated from the following:    Height as of this encounter: 182.9 cm (72.01\").    Weight as of this  encounter: 118 kg (259 lb 3.2 oz).     Wt Readings from Last 3 Encounters:   01/10/23 118 kg (259 lb 3.2 oz)   10/11/22 117 kg (258 lb 12.8 oz)   09/29/22 117 kg (259 lb)     BP Readings from Last 3 Encounters:   01/10/23 168/98   10/11/22 131/87   04/11/22 132/88       Physical Exam  Vitals and nursing note reviewed.   Constitutional:       Appearance: Normal appearance.   HENT:      Head: Normocephalic.   Eyes:      Extraocular Movements: Extraocular movements intact.      Conjunctiva/sclera: Conjunctivae normal.   Cardiovascular:      Rate and Rhythm: Normal rate and regular rhythm.      Heart sounds: Normal heart sounds. No murmur heard.  Pulmonary:      Effort: Pulmonary effort is normal.      Breath sounds: Normal breath sounds. No wheezing or rales.   Abdominal:      General: Bowel sounds are normal.      Palpations: Abdomen is soft.      Tenderness: There is no abdominal tenderness. There is no guarding.   Musculoskeletal:         General: No swelling.      Lumbar back: Tenderness present. Decreased range of motion. Positive left straight leg raise test.   Skin:     General: Skin is warm and dry.   Neurological:      General: No focal deficit present.      Mental Status: He is alert. Mental status is at baseline.   Psychiatric:         Mood and Affect: Mood normal.         Thought Content: Thought content normal.          Result Review        No Images in the past 120 days found..     The above data has been reviewed by NORA Harley 01/10/2023 12:09 EST.          Patient Care Team:  Jelena Mcfarlane APRN as PCP - General (Internal Medicine)  Alisia Cabral APRN as Nurse Practitioner (Nurse Practitioner)        ASSESSMENT & PLAN    Diagnoses and all orders for this visit:    1. Lumbar radiculopathy (Primary)  Assessment & Plan:  Patient complains of lower back pain that radiates down his left leg.  Patient states that he was loading his truck on Friday and that is when the pain started.  No significant  injury at the time.  Patient states pain is relieved with sitting and laying down.  Patient states that pain is worse with any range of motion, stretching or standing.  He denies any loss of bowel or bladder function.  Positive straight leg test.   Will have patient start steroid pack and muscle relaxers. L spine xray also ordered.  Avoid NSAIDS due to anticoag.  If pain persists, may need additional imaging/PT    Orders:  -     XR Spine Lumbar 4+ View; Future    2. Primary hypertension  Assessment & Plan:  Hayder has been elevated. Pt states at home it is staying above 140/80, prior to lower back pain.  Pt is currently on losartan 50 mg qday and lopressor 50 mg bid.  Plan: increase losartan to 100 mg qday. Cont lopressor. Keep BP log and follow up in 2 weeks      Other orders  -     methylPREDNISolone (MEDROL) 4 MG dose pack; Take as directed on package instructions.  Dispense: 21 each; Refill: 0  -     baclofen (LIORESAL) 10 MG tablet; Take 1 tablet by mouth 3 (Three) Times a Day As Needed for Muscle Spasms.  Dispense: 30 tablet; Refill: 0  -     losartan (COZAAR) 100 MG tablet; Take 1 tablet by mouth Daily.  Dispense: 90 tablet; Refill: 1       Tobacco Use: Low Risk    • Smoking Tobacco Use: Never   • Smokeless Tobacco Use: Never   • Passive Exposure: Not on file       Follow Up     Return in about 2 weeks (around 1/24/2023).    Please note that portions of this note were completed with a voice recognition program.    Patient was given instructions and counseling regarding his condition or for health maintenance advice. Please see specific information pulled into the AVS if appropriate.   I have reviewed information obtained and documented by others and I have confirmed the accuracy of this documented note.    NORA Harley

## 2023-01-10 NOTE — ASSESSMENT & PLAN NOTE
Hayder has been elevated. Pt states at home it is staying above 140/80, prior to lower back pain.  Pt is currently on losartan 50 mg qday and lopressor 50 mg bid.  Plan: increase losartan to 100 mg qday. Cont lopressor. Keep BP log and follow up in 2 weeks

## 2023-01-10 NOTE — ASSESSMENT & PLAN NOTE
Patient complains of lower back pain that radiates down his left leg.  Patient states that he was loading his truck on Friday and that is when the pain started.  No significant injury at the time.  Patient states pain is relieved with sitting and laying down.  Patient states that pain is worse with any range of motion, stretching or standing.  He denies any loss of bowel or bladder function.  Positive straight leg test.   Will have patient start steroid pack and muscle relaxers. L spine xray also ordered.  Avoid NSAIDS due to anticoag.  If pain persists, may need additional imaging/PT

## 2023-01-24 ENCOUNTER — OFFICE VISIT (OUTPATIENT)
Dept: INTERNAL MEDICINE | Facility: CLINIC | Age: 68
End: 2023-01-24
Payer: MEDICARE

## 2023-01-24 VITALS
RESPIRATION RATE: 18 BRPM | HEART RATE: 77 BPM | DIASTOLIC BLOOD PRESSURE: 91 MMHG | SYSTOLIC BLOOD PRESSURE: 148 MMHG | WEIGHT: 254.4 LBS | TEMPERATURE: 97.1 F | OXYGEN SATURATION: 100 % | BODY MASS INDEX: 34.46 KG/M2 | HEIGHT: 72 IN

## 2023-01-24 DIAGNOSIS — Z23 NEED FOR VACCINATION: ICD-10-CM

## 2023-01-24 DIAGNOSIS — M54.16 LUMBAR RADICULOPATHY: ICD-10-CM

## 2023-01-24 DIAGNOSIS — I10 PRIMARY HYPERTENSION: Primary | ICD-10-CM

## 2023-01-24 PROCEDURE — G0009 ADMIN PNEUMOCOCCAL VACCINE: HCPCS

## 2023-01-24 PROCEDURE — 90677 PCV20 VACCINE IM: CPT

## 2023-01-24 PROCEDURE — 99213 OFFICE O/P EST LOW 20 MIN: CPT

## 2023-01-24 RX ORDER — LOSARTAN POTASSIUM 50 MG/1
50 TABLET ORAL DAILY
COMMUNITY
Start: 2023-01-16 | End: 2023-01-24

## 2023-01-24 RX ORDER — METHOCARBAMOL 500 MG/1
500 TABLET, FILM COATED ORAL
Qty: 30 TABLET | Refills: 0 | Status: SHIPPED | OUTPATIENT
Start: 2023-01-24

## 2023-01-24 NOTE — PROGRESS NOTES
"Chief Complaint  Follow-up (Pt states that he is being seen for a follow up on his leg pain that he was having, pt states that he is a lot better it is a dull pain now every now and then like when he bends over.) and Immunizations (Pt is also wanting to get the pneumococcal vaccine. )    History of Present Illness  SUBJECTIVE  David Scott presents to [facility] to follow up on lumbar radiculopathy. Pt still has some pain that radiates down the left leg. Pt states it is not near as bad as it was nut still present..  Pt states that he was checking BP at home and it was running 128/80's. Pt states he is only taking 50 mg of the losartan      Past Medical History:   Diagnosis Date   • Chronic allergic rhinitis    • High blood pressure    • Personal history of colonic polyps 01/15/2016      Family History   Problem Relation Age of Onset   • Prostate cancer Brother    • Urolithiasis Brother       Past Surgical History:   Procedure Laterality Date   • COLONOSCOPY          Current Outpatient Medications:   •  Eliquis 5 MG tablet tablet, Take 5 mg by mouth 2 (Two) Times a Day., Disp: , Rfl:   •  losartan (COZAAR) 100 MG tablet, Take 1 tablet by mouth Daily., Disp: 90 tablet, Rfl: 1  •  metoprolol tartrate (LOPRESSOR) 50 MG tablet, Take 50 mg by mouth 2 (Two) Times a Day., Disp: , Rfl:   •  pravastatin (PRAVACHOL) 40 MG tablet, Daily., Disp: , Rfl:   •  methocarbamol (Robaxin) 500 MG tablet, Take 1 tablet by mouth 4 (Four) Times a Day Before Meals & at Bedtime As Needed for Muscle Spasms., Disp: 30 tablet, Rfl: 0    OBJECTIVE  Vital Signs:   /91 (BP Location: Left arm)   Pulse 77   Temp 97.1 °F (36.2 °C) (Temporal)   Resp 18   Ht 182.9 cm (72.01\")   Wt 115 kg (254 lb 6.4 oz)   SpO2 100%   BMI 34.50 kg/m²    Estimated body mass index is 34.5 kg/m² as calculated from the following:    Height as of this encounter: 182.9 cm (72.01\").    Weight as of this encounter: 115 kg (254 lb " 6.4 oz).     Wt Readings from Last 3 Encounters:   01/24/23 115 kg (254 lb 6.4 oz)   01/10/23 118 kg (259 lb 3.2 oz)   10/11/22 117 kg (258 lb 12.8 oz)     BP Readings from Last 3 Encounters:   01/24/23 148/91   01/10/23 168/98   10/11/22 131/87       Physical Exam  Vitals and nursing note reviewed.   Constitutional:       Appearance: Normal appearance.   HENT:      Head: Normocephalic and atraumatic.   Eyes:      Extraocular Movements: Extraocular movements intact.      Conjunctiva/sclera: Conjunctivae normal.   Cardiovascular:      Rate and Rhythm: Normal rate and regular rhythm.      Heart sounds: Normal heart sounds.   Pulmonary:      Effort: Pulmonary effort is normal.      Breath sounds: Normal breath sounds.   Abdominal:      General: Abdomen is flat. Bowel sounds are normal. There is no distension.      Palpations: Abdomen is soft. There is no mass.      Tenderness: There is no abdominal tenderness. There is no right CVA tenderness, left CVA tenderness, guarding or rebound.      Hernia: No hernia is present.   Musculoskeletal:      Cervical back: Normal range of motion.      Lumbar back: Decreased range of motion. Positive left straight leg raise test.   Skin:     General: Skin is warm and dry.   Neurological:      General: No focal deficit present.      Mental Status: He is alert and oriented to person, place, and time. Mental status is at baseline.   Psychiatric:         Mood and Affect: Mood normal.         Behavior: Behavior normal.         Thought Content: Thought content normal.         Judgment: Judgment normal.          Result Review        XR Spine Lumbar Complete 4+VW    Result Date: 1/10/2023    1. Moderate degenerative changes throughout the lumbar spine.    MERCED GREENE MD       Electronically Signed and Approved By: MERCED GREENE MD on 1/10/2023 at 16:10                The above data has been reviewed by NORA Harley 01/24/2023 09:46 EST.          Patient Care Team:  Jelena Mcfarlane APRN  as PCP - General (Internal Medicine)  Alisia Cabral APRN as Nurse Practitioner (Nurse Practitioner)        ASSESSMENT & PLAN    Diagnoses and all orders for this visit:    1. Primary hypertension (Primary)  Assessment & Plan:  Blood pressure remains elevated today in the office.  Patient states she is only taking 50 mg of losartan.  Patient states he is checking his blood pressure at home and it is running around 130 over 80s.  Blood pressure today in the office 148/91.  I rechecked it manually and got emergency reading.  Patient to start losartan 100 mg daily and continue Lopressor 50 mg twice daily.  Patient was counseled.  Low-sodium diet and weight loss will also improve blood pressure.  Patient to monitor his blood pressure at home as well.      2. Lumbar radiculopathy  Assessment & Plan:  1/24/2023-patient is here to follow-up on lumbar radiculopathy.  Patient states that pain has improved quite a bit but it is still present.  Patient states the pain does radiate down his legs.  He finished a steroid pack.  He states the baclofen made him tired.  He states he is taken Tylenol over-the-counter.  We will go ahead and refer to physical therapy.  We will give him Robaxin to take as needed as well.  May also take Tylenol as needed.  1/10/23-Patient complains of lower back pain that radiates down his left leg.  Patient states that he was loading his truck on Friday and that is when the pain started.  No significant injury at the time.  Patient states pain is relieved with sitting and laying down.  Patient states that pain is worse with any range of motion, stretching or standing.  He denies any loss of bowel or bladder function.  Positive straight leg test.   Will have patient start steroid pack and muscle relaxers. L spine xray also ordered.  Avoid NSAIDS due to anticoag.  If pain persists, may need additional imaging/PT    Orders:  -     Ambulatory Referral to Physical Therapy    3. Need for vaccination  -      Pneumococcal Conjugate Vaccine 20-Valent (PCV20)    Other orders  -     methocarbamol (Robaxin) 500 MG tablet; Take 1 tablet by mouth 4 (Four) Times a Day Before Meals & at Bedtime As Needed for Muscle Spasms.  Dispense: 30 tablet; Refill: 0       Tobacco Use: Low Risk    • Smoking Tobacco Use: Never   • Smokeless Tobacco Use: Never   • Passive Exposure: Not on file       Follow Up     Return for Next scheduled follow up.    Please note that portions of this note were completed with a voice recognition program.    Patient was given instructions and counseling regarding his condition or for health maintenance advice. Please see specific information pulled into the AVS if appropriate.   I have reviewed information obtained and documented by others and I have confirmed the accuracy of this documented note.    NORA Harley        Answers for HPI/ROS submitted by the patient on 1/22/2023  What is the primary reason for your visit?: Back Pain

## 2023-01-24 NOTE — ASSESSMENT & PLAN NOTE
Blood pressure remains elevated today in the office.  Patient states she is only taking 50 mg of losartan.  Patient states he is checking his blood pressure at home and it is running around 130 over 80s.  Blood pressure today in the office 148/91.  I rechecked it manually and got emergency reading.  Patient to start losartan 100 mg daily and continue Lopressor 50 mg twice daily.  Patient was counseled.  Low-sodium diet and weight loss will also improve blood pressure.  Patient to monitor his blood pressure at home as well.

## 2023-01-24 NOTE — ASSESSMENT & PLAN NOTE
1/24/2023-patient is here to follow-up on lumbar radiculopathy.  Patient states that pain has improved quite a bit but it is still present.  Patient states the pain does radiate down his legs.  He finished a steroid pack.  He states the baclofen made him tired.  He states he is taken Tylenol over-the-counter.  We will go ahead and refer to physical therapy.  We will give him Robaxin to take as needed as well.  May also take Tylenol as needed.  1/10/23-Patient complains of lower back pain that radiates down his left leg.  Patient states that he was loading his truck on Friday and that is when the pain started.  No significant injury at the time.  Patient states pain is relieved with sitting and laying down.  Patient states that pain is worse with any range of motion, stretching or standing.  He denies any loss of bowel or bladder function.  Positive straight leg test.   Will have patient start steroid pack and muscle relaxers. L spine xray also ordered.  Avoid NSAIDS due to anticoag.  If pain persists, may need additional imaging/PT

## 2023-02-20 ENCOUNTER — TELEPHONE (OUTPATIENT)
Dept: PHYSICAL THERAPY | Facility: CLINIC | Age: 68
End: 2023-02-20
Payer: MEDICARE

## 2023-03-20 ENCOUNTER — LAB (OUTPATIENT)
Dept: LAB | Facility: HOSPITAL | Age: 68
End: 2023-03-20
Payer: MEDICARE

## 2023-03-20 DIAGNOSIS — I10 PRIMARY HYPERTENSION: ICD-10-CM

## 2023-03-20 DIAGNOSIS — N40.0 BENIGN PROSTATIC HYPERPLASIA WITHOUT LOWER URINARY TRACT SYMPTOMS: ICD-10-CM

## 2023-03-20 DIAGNOSIS — E55.9 VITAMIN D DEFICIENCY: ICD-10-CM

## 2023-03-20 DIAGNOSIS — E78.2 MIXED HYPERLIPIDEMIA: ICD-10-CM

## 2023-03-20 DIAGNOSIS — R73.01 IMPAIRED FASTING GLUCOSE: ICD-10-CM

## 2023-03-20 DIAGNOSIS — R97.20 ELEVATED PROSTATE SPECIFIC ANTIGEN (PSA): ICD-10-CM

## 2023-03-20 LAB
25(OH)D3 SERPL-MCNC: 15.2 NG/ML (ref 30–100)
ALBUMIN SERPL-MCNC: 4.5 G/DL (ref 3.5–5.2)
ALBUMIN/GLOB SERPL: 1.6 G/DL
ALP SERPL-CCNC: 87 U/L (ref 39–117)
ALT SERPL W P-5'-P-CCNC: 18 U/L (ref 1–41)
ANION GAP SERPL CALCULATED.3IONS-SCNC: 9.6 MMOL/L (ref 5–15)
AST SERPL-CCNC: 14 U/L (ref 1–40)
BACTERIA UR QL AUTO: NORMAL /HPF
BASOPHILS # BLD AUTO: 0.08 10*3/MM3 (ref 0–0.2)
BASOPHILS NFR BLD AUTO: 0.9 % (ref 0–1.5)
BILIRUB SERPL-MCNC: 1.1 MG/DL (ref 0–1.2)
BILIRUB UR QL STRIP: NEGATIVE
BUN SERPL-MCNC: 11 MG/DL (ref 8–23)
BUN/CREAT SERPL: 12.4 (ref 7–25)
CALCIUM SPEC-SCNC: 10.3 MG/DL (ref 8.6–10.5)
CHLORIDE SERPL-SCNC: 105 MMOL/L (ref 98–107)
CHOLEST SERPL-MCNC: 115 MG/DL (ref 0–200)
CLARITY UR: CLEAR
CO2 SERPL-SCNC: 29.4 MMOL/L (ref 22–29)
COLOR UR: YELLOW
CREAT SERPL-MCNC: 0.89 MG/DL (ref 0.76–1.27)
DEPRECATED RDW RBC AUTO: 40.3 FL (ref 37–54)
EGFRCR SERPLBLD CKD-EPI 2021: 94.5 ML/MIN/1.73
EOSINOPHIL # BLD AUTO: 0.28 10*3/MM3 (ref 0–0.4)
EOSINOPHIL NFR BLD AUTO: 3.1 % (ref 0.3–6.2)
ERYTHROCYTE [DISTWIDTH] IN BLOOD BY AUTOMATED COUNT: 12.9 % (ref 12.3–15.4)
GLOBULIN UR ELPH-MCNC: 2.9 GM/DL
GLUCOSE SERPL-MCNC: 109 MG/DL (ref 65–99)
GLUCOSE UR STRIP-MCNC: NEGATIVE MG/DL
HBA1C MFR BLD: 5.6 % (ref 4.8–5.6)
HCT VFR BLD AUTO: 45.5 % (ref 37.5–51)
HDLC SERPL-MCNC: 32 MG/DL (ref 40–60)
HGB BLD-MCNC: 15.6 G/DL (ref 13–17.7)
HGB UR QL STRIP.AUTO: NEGATIVE
HYALINE CASTS UR QL AUTO: NORMAL /LPF
IMM GRANULOCYTES # BLD AUTO: 0.01 10*3/MM3 (ref 0–0.05)
IMM GRANULOCYTES NFR BLD AUTO: 0.1 % (ref 0–0.5)
KETONES UR QL STRIP: NEGATIVE
LDLC SERPL CALC-MCNC: 61 MG/DL (ref 0–100)
LDLC/HDLC SERPL: 1.85 {RATIO}
LEUKOCYTE ESTERASE UR QL STRIP.AUTO: NEGATIVE
LYMPHOCYTES # BLD AUTO: 2.75 10*3/MM3 (ref 0.7–3.1)
LYMPHOCYTES NFR BLD AUTO: 30.3 % (ref 19.6–45.3)
MCH RBC QN AUTO: 29.7 PG (ref 26.6–33)
MCHC RBC AUTO-ENTMCNC: 34.3 G/DL (ref 31.5–35.7)
MCV RBC AUTO: 86.7 FL (ref 79–97)
MONOCYTES # BLD AUTO: 0.61 10*3/MM3 (ref 0.1–0.9)
MONOCYTES NFR BLD AUTO: 6.7 % (ref 5–12)
NEUTROPHILS NFR BLD AUTO: 5.34 10*3/MM3 (ref 1.7–7)
NEUTROPHILS NFR BLD AUTO: 58.9 % (ref 42.7–76)
NITRITE UR QL STRIP: NEGATIVE
NRBC BLD AUTO-RTO: 0 /100 WBC (ref 0–0.2)
PH UR STRIP.AUTO: 6 [PH] (ref 5–8)
PLATELET # BLD AUTO: 261 10*3/MM3 (ref 140–450)
PMV BLD AUTO: 10.7 FL (ref 6–12)
POTASSIUM SERPL-SCNC: 4.9 MMOL/L (ref 3.5–5.2)
PROT SERPL-MCNC: 7.4 G/DL (ref 6–8.5)
PROT UR QL STRIP: ABNORMAL
PSA SERPL-MCNC: 8.68 NG/ML (ref 0–4)
RBC # BLD AUTO: 5.25 10*6/MM3 (ref 4.14–5.8)
RBC # UR STRIP: NORMAL /HPF
REF LAB TEST METHOD: NORMAL
SODIUM SERPL-SCNC: 144 MMOL/L (ref 136–145)
SP GR UR STRIP: 1.02 (ref 1–1.03)
SQUAMOUS #/AREA URNS HPF: NORMAL /HPF
TRIGL SERPL-MCNC: 119 MG/DL (ref 0–150)
TSH SERPL DL<=0.05 MIU/L-ACNC: 3.79 UIU/ML (ref 0.27–4.2)
UROBILINOGEN UR QL STRIP: ABNORMAL
VLDLC SERPL-MCNC: 22 MG/DL (ref 5–40)
WBC # UR STRIP: NORMAL /HPF
WBC NRBC COR # BLD: 9.07 10*3/MM3 (ref 3.4–10.8)

## 2023-03-20 PROCEDURE — 82306 VITAMIN D 25 HYDROXY: CPT

## 2023-03-20 PROCEDURE — 84153 ASSAY OF PSA TOTAL: CPT

## 2023-03-20 PROCEDURE — 36415 COLL VENOUS BLD VENIPUNCTURE: CPT

## 2023-03-20 PROCEDURE — 80053 COMPREHEN METABOLIC PANEL: CPT

## 2023-03-20 PROCEDURE — 83036 HEMOGLOBIN GLYCOSYLATED A1C: CPT

## 2023-03-20 PROCEDURE — 84443 ASSAY THYROID STIM HORMONE: CPT

## 2023-03-20 PROCEDURE — 80061 LIPID PANEL: CPT

## 2023-03-20 PROCEDURE — 85025 COMPLETE CBC W/AUTO DIFF WBC: CPT

## 2023-03-20 PROCEDURE — 81001 URINALYSIS AUTO W/SCOPE: CPT

## 2023-03-27 RX ORDER — LOSARTAN POTASSIUM 50 MG/1
TABLET ORAL
COMMUNITY
Start: 2023-02-17

## 2023-03-27 RX ORDER — AMLODIPINE BESYLATE 5 MG/1
1 TABLET ORAL DAILY
COMMUNITY
Start: 2023-03-02

## 2023-03-29 ENCOUNTER — OFFICE VISIT (OUTPATIENT)
Dept: UROLOGY | Facility: CLINIC | Age: 68
End: 2023-03-29
Payer: MEDICARE

## 2023-03-29 VITALS — RESPIRATION RATE: 16 BRPM | HEIGHT: 72 IN | WEIGHT: 253.8 LBS | BODY MASS INDEX: 34.38 KG/M2

## 2023-03-29 DIAGNOSIS — R97.20 ELEVATED PROSTATE SPECIFIC ANTIGEN (PSA): ICD-10-CM

## 2023-03-29 DIAGNOSIS — N40.0 BENIGN PROSTATIC HYPERPLASIA WITHOUT LOWER URINARY TRACT SYMPTOMS: Primary | ICD-10-CM

## 2023-03-29 PROCEDURE — 99213 OFFICE O/P EST LOW 20 MIN: CPT | Performed by: UROLOGY

## 2023-03-29 PROCEDURE — 1160F RVW MEDS BY RX/DR IN RCRD: CPT | Performed by: UROLOGY

## 2023-03-29 PROCEDURE — 1159F MED LIST DOCD IN RCRD: CPT | Performed by: UROLOGY

## 2023-03-29 NOTE — PROGRESS NOTES
"    UROLOGY OFFICE follow up NOTE    Subjective   HPI  David Scott is a 67 y.o. male.  Presents for follow-up of elevated PSA.  Last seen by urology ARNP, Alisia Moser, 9/29/2022 for elevated PSA.    He denies any urinary issues, leakage, or urgency. His stream is weak, but he is able to urinate.    The patient's oldest brother, age 84 was diagnosed with prostate cancer about 1 to 2 years ago treated with low dose radiation. He denies any family history of prostate enlargement.    The patient put his back out about 3 months ago and asks if that could have elevated his PSA.       ___________  PSA  3/20/2023: 8.68  8/4/2022: 6.36  3/23/2022: 5.47  8/25/2021: 5.58  1/29/2021: 4.61  7/30/2020: 5.43  8/1/2019: 3.84    MRI prostate, 9/25/2021 ([facility]) 108 mL gland; no focal abnormalities suspicious for clinically significant prostate cancer, prostate enlargement with BPH      Review of systems  A review of systems was performed, and positive findings are noted in the HPI.    Objective     Vital Signs:   Resp 16   Ht 182.9 cm (72\")   Wt 115 kg (253 lb 12.8 oz)   BMI 34.42 kg/m²       Physical exam  No acute distress, well-nourished  Awake alert and oriented  Mood normal; affect normal    Problem List:  Patient Active Problem List   Diagnosis   • Colon cancer screening   • Allergic rhinitis   • High blood pressure   • History of colonic polyps   • Hyperlipidemia   • Vitamin D deficiency   • Elevated prostate specific antigen (PSA)   • Prostate nodule   • Paroxysmal atrial fibrillation (HCC)   • Cardiomegaly   • Long term (current) use of anticoagulants   • Metabolic syndrome   • Mitral valve disorder   • Systolic murmur   • Benign prostatic hyperplasia without lower urinary tract symptoms   • Onychomycosis   • Class 2 obesity due to excess calories with body mass index (BMI) of 35.0 to 35.9 in adult   • Lumbar radiculopathy       Assessment & Plan      Benign prostatic hyperplasia/elevated PSA  We will continue to " monitor the PSA.  We will recheck his PSA in 3 months.    Follow-up in 3 months. All questions and concerns have been addressed at this time.    Diagnoses and all orders for this visit:    1. Benign prostatic hyperplasia without lower urinary tract symptoms (Primary)    2. Elevated prostate specific antigen (PSA)  -     PSA DIAGNOSTIC; Future        The patient's PSA has increased from 8.68 from 6.36 ng/mL; prior trend reviewed  Known significant prostatomegaly of over 100 mL.  Prior MRI without suspicious focal lesion.    Recommend close interval recheck of PSA to guide management recommendation.    If his PSA is persistently elevated, I would recommend obtaining a repeat prostate MRI.     If his PSA is trending down, then my recommendation will be continued surveillance.    Patient agreeable with the above    Recheck PSA in 3 months, follow-up after  All questions addressed          Transcribed from ambient dictation for Luz Eid MD by Mariann Joe.  03/29/23   15:00 EDT    Patient or patient representative verbalized consent to the visit recording.  I have personally performed the services described in this document as transcribed by the above individual, and it is both accurate and complete.

## 2023-04-11 ENCOUNTER — OFFICE VISIT (OUTPATIENT)
Dept: INTERNAL MEDICINE | Facility: CLINIC | Age: 68
End: 2023-04-11
Payer: MEDICARE

## 2023-04-11 VITALS
BODY MASS INDEX: 34.4 KG/M2 | HEIGHT: 72 IN | HEART RATE: 80 BPM | WEIGHT: 254 LBS | SYSTOLIC BLOOD PRESSURE: 124 MMHG | OXYGEN SATURATION: 96 % | RESPIRATION RATE: 18 BRPM | DIASTOLIC BLOOD PRESSURE: 85 MMHG | TEMPERATURE: 97.8 F

## 2023-04-11 DIAGNOSIS — R73.01 IMPAIRED FASTING GLUCOSE: ICD-10-CM

## 2023-04-11 DIAGNOSIS — E55.9 VITAMIN D DEFICIENCY: ICD-10-CM

## 2023-04-11 DIAGNOSIS — E78.2 MIXED HYPERLIPIDEMIA: ICD-10-CM

## 2023-04-11 DIAGNOSIS — I10 PRIMARY HYPERTENSION: ICD-10-CM

## 2023-04-11 DIAGNOSIS — M54.16 LUMBAR RADICULOPATHY: ICD-10-CM

## 2023-04-11 DIAGNOSIS — E66.09 CLASS 2 OBESITY DUE TO EXCESS CALORIES WITH BODY MASS INDEX (BMI) OF 35.0 TO 35.9 IN ADULT, UNSPECIFIED WHETHER SERIOUS COMORBIDITY PRESENT: ICD-10-CM

## 2023-04-11 DIAGNOSIS — N40.0 BENIGN PROSTATIC HYPERPLASIA WITHOUT LOWER URINARY TRACT SYMPTOMS: ICD-10-CM

## 2023-04-11 DIAGNOSIS — I48.0 PAROXYSMAL ATRIAL FIBRILLATION: Primary | ICD-10-CM

## 2023-04-11 DIAGNOSIS — R97.20 ELEVATED PROSTATE SPECIFIC ANTIGEN (PSA): ICD-10-CM

## 2023-04-11 RX ORDER — ERGOCALCIFEROL 1.25 MG/1
50000 CAPSULE ORAL WEEKLY
Qty: 12 CAPSULE | Refills: 1 | Status: SHIPPED | OUTPATIENT
Start: 2023-04-11

## 2023-04-11 NOTE — PROGRESS NOTES
"Chief Complaint  Follow-up (Pt states that he is being seen for a follow up he states that his leg is better than what it was with his sciatic nerve but he is still having the pain down his thigh. )    History of Present Illness  SUBJECTIVE  David Scott presents to [facility] for his routine follow up.    Patient states that his lower back is improving. He states he went to the chiropractor and it really helped.      Patient denies any chest pain, shortness of breath, palpitations, nausea, vomiting, diarrhea, issues with bowel or bladder function.    Past Medical History:   Diagnosis Date   • Chronic allergic rhinitis    • High blood pressure    • Personal history of colonic polyps 01/15/2016      Family History   Problem Relation Age of Onset   • Prostate cancer Brother    • Urolithiasis Brother       Past Surgical History:   Procedure Laterality Date   • COLONOSCOPY          Current Outpatient Medications:   •  amLODIPine (NORVASC) 5 MG tablet, Take 1 tablet by mouth Daily., Disp: , Rfl:   •  Eliquis 5 MG tablet tablet, Take 1 tablet by mouth 2 (Two) Times a Day., Disp: , Rfl:   •  losartan (COZAAR) 100 MG tablet, Take 1 tablet by mouth Daily., Disp: 90 tablet, Rfl: 1  •  losartan (COZAAR) 50 MG tablet, , Disp: , Rfl:   •  methocarbamol (Robaxin) 500 MG tablet, Take 1 tablet by mouth 4 (Four) Times a Day Before Meals & at Bedtime As Needed for Muscle Spasms., Disp: 30 tablet, Rfl: 0  •  metoprolol tartrate (LOPRESSOR) 50 MG tablet, Take 1 tablet by mouth 2 (Two) Times a Day., Disp: , Rfl:   •  pravastatin (PRAVACHOL) 40 MG tablet, Daily., Disp: , Rfl:   •  vitamin D (ERGOCALCIFEROL) 1.25 MG (68804 UT) capsule capsule, Take 1 capsule by mouth 1 (One) Time Per Week., Disp: 12 capsule, Rfl: 1    OBJECTIVE  Vital Signs:   /85 (BP Location: Left arm)   Pulse 80   Temp 97.8 °F (36.6 °C) (Temporal)   Resp 18   Ht 182.9 cm (72.01\")   Wt 115 kg (254 lb)   SpO2 96%   BMI " "34.44 kg/m²    Estimated body mass index is 34.44 kg/m² as calculated from the following:    Height as of this encounter: 182.9 cm (72.01\").    Weight as of this encounter: 115 kg (254 lb).     Wt Readings from Last 3 Encounters:   04/11/23 115 kg (254 lb)   03/29/23 115 kg (253 lb 12.8 oz)   01/24/23 115 kg (254 lb 6.4 oz)     BP Readings from Last 3 Encounters:   04/11/23 124/85   01/24/23 148/91   01/10/23 168/98       Physical Exam  Vitals and nursing note reviewed.   Constitutional:       Appearance: Normal appearance.   HENT:      Head: Normocephalic.   Eyes:      Extraocular Movements: Extraocular movements intact.      Conjunctiva/sclera: Conjunctivae normal.   Cardiovascular:      Rate and Rhythm: Normal rate and regular rhythm.      Heart sounds: Normal heart sounds. No murmur heard.  Pulmonary:      Effort: Pulmonary effort is normal.      Breath sounds: Normal breath sounds. No wheezing or rales.   Abdominal:      General: Bowel sounds are normal.      Palpations: Abdomen is soft.      Tenderness: There is no abdominal tenderness. There is no guarding.   Musculoskeletal:         General: No swelling. Normal range of motion.   Skin:     General: Skin is warm and dry.   Neurological:      General: No focal deficit present.      Mental Status: He is alert and oriented to person, place, and time. Mental status is at baseline.   Psychiatric:         Mood and Affect: Mood normal.         Behavior: Behavior normal.         Thought Content: Thought content normal.         Judgment: Judgment normal.          Result Review      Labs reviewed with patient.    XR Spine Lumbar Complete 4+VW    Result Date: 1/10/2023    1. Moderate degenerative changes throughout the lumbar spine.    MERCED GREENE MD       Electronically Signed and Approved By: MERCED GREENE MD on 1/10/2023 at 16:10                The above data has been reviewed by NORA Harley 04/11/2023 15:35 EDT.          Patient Care Team:  Jelena Mcfarlane, " NORA as PCP - General (Internal Medicine)  Alisia Cabral APRN as Nurse Practitioner (Nurse Practitioner)  Luz Eid MD as Consulting Physician (Urology)      ASSESSMENT & PLAN    Diagnoses and all orders for this visit:    1. Paroxysmal atrial fibrillation (Primary)  Assessment & Plan:  Patient has a history of atrial fibrillation.  He denies any shortness of breath, palpitations or chest pain.  Denies any dizziness.  He is on Eliquis 5 mg twice daily for anticoagulation and Toprol 50 mg twice daily.  Continue current medication regimen.    Orders:  -     CBC & Differential; Future  -     Comprehensive Metabolic Panel; Future  -     Lipid Panel; Future  -     Hemoglobin A1c; Future  -     Microalbumin / Creatinine Urine Ratio - Urine, Clean Catch; Future  -     TSH Rfx On Abnormal To Free T4; Future  -     Vitamin B12 & Folate; Future    2. Mixed hyperlipidemia  Assessment & Plan:  Lipids are very well controlled.  Continue current regimen.    Orders:  -     CBC & Differential; Future  -     Comprehensive Metabolic Panel; Future  -     Lipid Panel; Future  -     Hemoglobin A1c; Future  -     Microalbumin / Creatinine Urine Ratio - Urine, Clean Catch; Future  -     TSH Rfx On Abnormal To Free T4; Future  -     Vitamin B12 & Folate; Future    3. Primary hypertension  Assessment & Plan:  Pressure is well controlled.  Continue current medication regimen.      4. Elevated prostate specific antigen (PSA)  Assessment & Plan:  PSA level continues to elevate.  Patient is followed by urology.  If PSA is elevated again in a few months, patient reports that he will have an MRI of the prostate again.      5. Class 2 obesity due to excess calories with body mass index (BMI) of 35.0 to 35.9 in adult, unspecified whether serious comorbidity present  Assessment & Plan:  Patient is obese.  He admits that he is not as active as he used to be.  He has also lost about a pound or 2 since his last visit.  Recommend healthy  diet, regular exercise.    Orders:  -     CBC & Differential; Future  -     Comprehensive Metabolic Panel; Future  -     Lipid Panel; Future  -     Hemoglobin A1c; Future  -     Microalbumin / Creatinine Urine Ratio - Urine, Clean Catch; Future  -     TSH Rfx On Abnormal To Free T4; Future  -     Vitamin B12 & Folate; Future    6. Benign prostatic hyperplasia without lower urinary tract symptoms  -     CBC & Differential; Future  -     Comprehensive Metabolic Panel; Future  -     Lipid Panel; Future  -     Hemoglobin A1c; Future  -     Microalbumin / Creatinine Urine Ratio - Urine, Clean Catch; Future  -     TSH Rfx On Abnormal To Free T4; Future  -     Vitamin B12 & Folate; Future    7. Lumbar radiculopathy    8. Vitamin D deficiency  Assessment & Plan:  Patient is not on any vitamin D.  Vitamin D is low.  Start vitamin D 50,000 units once a week.    Orders:  -     Vitamin D,25-Hydroxy; Future    9. Impaired fasting glucose  -     Hemoglobin A1c; Future    Other orders  -     vitamin D (ERGOCALCIFEROL) 1.25 MG (18957 UT) capsule capsule; Take 1 capsule by mouth 1 (One) Time Per Week.  Dispense: 12 capsule; Refill: 1       Tobacco Use: Low Risk    • Smoking Tobacco Use: Never   • Smokeless Tobacco Use: Never   • Passive Exposure: Not on file       Follow Up     Return in about 6 months (around 10/12/2023) for Medicare Wellness.    Please note that portions of this note were completed with a voice recognition program.    Patient was given instructions and counseling regarding his condition or for health maintenance advice. Please see specific information pulled into the AVS if appropriate.   I have reviewed information obtained and documented by others and I have confirmed the accuracy of this documented note.    NORA Harley

## 2023-04-11 NOTE — ASSESSMENT & PLAN NOTE
PSA level continues to elevate.  Patient is followed by urology.  If PSA is elevated again in a few months, patient reports that he will have an MRI of the prostate again.

## 2023-04-11 NOTE — ASSESSMENT & PLAN NOTE
Patient has a history of atrial fibrillation.  He denies any shortness of breath, palpitations or chest pain.  Denies any dizziness.  He is on Eliquis 5 mg twice daily for anticoagulation and Toprol 50 mg twice daily.  Continue current medication regimen.

## 2023-04-11 NOTE — ASSESSMENT & PLAN NOTE
Patient is obese.  He admits that he is not as active as he used to be.  He has also lost about a pound or 2 since his last visit.  Recommend healthy diet, regular exercise.

## 2023-08-21 ENCOUNTER — LAB (OUTPATIENT)
Dept: LAB | Facility: HOSPITAL | Age: 68
End: 2023-08-21
Payer: MEDICARE

## 2023-08-21 ENCOUNTER — TRANSCRIBE ORDERS (OUTPATIENT)
Dept: LAB | Facility: HOSPITAL | Age: 68
End: 2023-08-21
Payer: MEDICARE

## 2023-08-21 DIAGNOSIS — Z12.5 SPECIAL SCREENING FOR MALIGNANT NEOPLASM OF PROSTATE: ICD-10-CM

## 2023-08-21 DIAGNOSIS — I10 HYPERTENSION, ESSENTIAL: Primary | ICD-10-CM

## 2023-08-21 DIAGNOSIS — I48.91 ATRIAL FIBRILLATION, UNSPECIFIED TYPE: ICD-10-CM

## 2023-08-21 DIAGNOSIS — E78.5 HYPERLIPIDEMIA, UNSPECIFIED HYPERLIPIDEMIA TYPE: ICD-10-CM

## 2023-08-21 DIAGNOSIS — I10 HYPERTENSION, ESSENTIAL: ICD-10-CM

## 2023-08-21 PROCEDURE — G0103 PSA SCREENING: HCPCS

## 2023-08-21 PROCEDURE — 80053 COMPREHEN METABOLIC PANEL: CPT

## 2023-08-21 PROCEDURE — 36415 COLL VENOUS BLD VENIPUNCTURE: CPT

## 2023-08-21 PROCEDURE — 80061 LIPID PANEL: CPT

## 2023-08-22 LAB
ALBUMIN SERPL-MCNC: 4.1 G/DL (ref 3.5–5.2)
ALBUMIN/GLOB SERPL: 1.5 G/DL
ALP SERPL-CCNC: 78 U/L (ref 39–117)
ALT SERPL W P-5'-P-CCNC: 18 U/L (ref 1–41)
ANION GAP SERPL CALCULATED.3IONS-SCNC: 10.2 MMOL/L (ref 5–15)
AST SERPL-CCNC: 19 U/L (ref 1–40)
BILIRUB SERPL-MCNC: 1.1 MG/DL (ref 0–1.2)
BUN SERPL-MCNC: 11 MG/DL (ref 8–23)
BUN/CREAT SERPL: 14.3 (ref 7–25)
CALCIUM SPEC-SCNC: 9.5 MG/DL (ref 8.6–10.5)
CHLORIDE SERPL-SCNC: 100 MMOL/L (ref 98–107)
CHOLEST SERPL-MCNC: 113 MG/DL (ref 0–200)
CO2 SERPL-SCNC: 25.8 MMOL/L (ref 22–29)
CREAT SERPL-MCNC: 0.77 MG/DL (ref 0.76–1.27)
EGFRCR SERPLBLD CKD-EPI 2021: 98.7 ML/MIN/1.73
GLOBULIN UR ELPH-MCNC: 2.8 GM/DL
GLUCOSE SERPL-MCNC: 89 MG/DL (ref 65–99)
HDLC SERPL-MCNC: 31 MG/DL (ref 40–60)
LDLC SERPL CALC-MCNC: 65 MG/DL (ref 0–100)
LDLC/HDLC SERPL: 2.11 {RATIO}
POTASSIUM SERPL-SCNC: 4.1 MMOL/L (ref 3.5–5.2)
PROT SERPL-MCNC: 6.9 G/DL (ref 6–8.5)
PSA SERPL-MCNC: 7.19 NG/ML (ref 0–4)
SODIUM SERPL-SCNC: 136 MMOL/L (ref 136–145)
TRIGL SERPL-MCNC: 83 MG/DL (ref 0–150)
VLDLC SERPL-MCNC: 17 MG/DL (ref 5–40)

## 2023-10-11 ENCOUNTER — LAB (OUTPATIENT)
Dept: LAB | Facility: HOSPITAL | Age: 68
End: 2023-10-11
Payer: MEDICARE

## 2023-10-11 DIAGNOSIS — E55.9 VITAMIN D DEFICIENCY: ICD-10-CM

## 2023-10-11 DIAGNOSIS — E78.2 MIXED HYPERLIPIDEMIA: ICD-10-CM

## 2023-10-11 DIAGNOSIS — R97.20 ELEVATED PSA: ICD-10-CM

## 2023-10-11 DIAGNOSIS — I48.0 PAROXYSMAL ATRIAL FIBRILLATION: ICD-10-CM

## 2023-10-11 DIAGNOSIS — E66.09 CLASS 2 OBESITY DUE TO EXCESS CALORIES WITH BODY MASS INDEX (BMI) OF 35.0 TO 35.9 IN ADULT, UNSPECIFIED WHETHER SERIOUS COMORBIDITY PRESENT: ICD-10-CM

## 2023-10-11 DIAGNOSIS — R73.01 IMPAIRED FASTING GLUCOSE: ICD-10-CM

## 2023-10-11 DIAGNOSIS — N40.0 BENIGN PROSTATIC HYPERPLASIA WITHOUT LOWER URINARY TRACT SYMPTOMS: ICD-10-CM

## 2023-10-11 LAB
25(OH)D3 SERPL-MCNC: 43.6 NG/ML (ref 30–100)
ALBUMIN SERPL-MCNC: 4.3 G/DL (ref 3.5–5.2)
ALBUMIN UR-MCNC: <1.2 MG/DL
ALBUMIN/GLOB SERPL: 1.7 G/DL
ALP SERPL-CCNC: 78 U/L (ref 39–117)
ALT SERPL W P-5'-P-CCNC: 14 U/L (ref 1–41)
ANION GAP SERPL CALCULATED.3IONS-SCNC: 8.3 MMOL/L (ref 5–15)
AST SERPL-CCNC: 16 U/L (ref 1–40)
BASOPHILS # BLD AUTO: 0.06 10*3/MM3 (ref 0–0.2)
BASOPHILS NFR BLD AUTO: 0.7 % (ref 0–1.5)
BILIRUB SERPL-MCNC: 1 MG/DL (ref 0–1.2)
BUN SERPL-MCNC: 11 MG/DL (ref 8–23)
BUN/CREAT SERPL: 14.5 (ref 7–25)
CALCIUM SPEC-SCNC: 9.4 MG/DL (ref 8.6–10.5)
CHLORIDE SERPL-SCNC: 103 MMOL/L (ref 98–107)
CHOLEST SERPL-MCNC: 120 MG/DL (ref 0–200)
CO2 SERPL-SCNC: 27.7 MMOL/L (ref 22–29)
CREAT SERPL-MCNC: 0.76 MG/DL (ref 0.76–1.27)
CREAT UR-MCNC: 77.8 MG/DL
DEPRECATED RDW RBC AUTO: 39.7 FL (ref 37–54)
EGFRCR SERPLBLD CKD-EPI 2021: 98.5 ML/MIN/1.73
EOSINOPHIL # BLD AUTO: 0.35 10*3/MM3 (ref 0–0.4)
EOSINOPHIL NFR BLD AUTO: 4.2 % (ref 0.3–6.2)
ERYTHROCYTE [DISTWIDTH] IN BLOOD BY AUTOMATED COUNT: 12.8 % (ref 12.3–15.4)
FOLATE SERPL-MCNC: 11.7 NG/ML (ref 4.78–24.2)
GLOBULIN UR ELPH-MCNC: 2.6 GM/DL
GLUCOSE SERPL-MCNC: 99 MG/DL (ref 65–99)
HBA1C MFR BLD: 5.6 % (ref 4.8–5.6)
HCT VFR BLD AUTO: 42.5 % (ref 37.5–51)
HDLC SERPL-MCNC: 34 MG/DL (ref 40–60)
HGB BLD-MCNC: 14.7 G/DL (ref 13–17.7)
IMM GRANULOCYTES # BLD AUTO: 0.02 10*3/MM3 (ref 0–0.05)
IMM GRANULOCYTES NFR BLD AUTO: 0.2 % (ref 0–0.5)
LDLC SERPL CALC-MCNC: 69 MG/DL (ref 0–100)
LDLC/HDLC SERPL: 2.03 {RATIO}
LYMPHOCYTES # BLD AUTO: 2.24 10*3/MM3 (ref 0.7–3.1)
LYMPHOCYTES NFR BLD AUTO: 26.9 % (ref 19.6–45.3)
MCH RBC QN AUTO: 29.8 PG (ref 26.6–33)
MCHC RBC AUTO-ENTMCNC: 34.6 G/DL (ref 31.5–35.7)
MCV RBC AUTO: 86 FL (ref 79–97)
MICROALBUMIN/CREAT UR: NORMAL MG/G{CREAT}
MONOCYTES # BLD AUTO: 0.51 10*3/MM3 (ref 0.1–0.9)
MONOCYTES NFR BLD AUTO: 6.1 % (ref 5–12)
NEUTROPHILS NFR BLD AUTO: 5.16 10*3/MM3 (ref 1.7–7)
NEUTROPHILS NFR BLD AUTO: 61.9 % (ref 42.7–76)
NRBC BLD AUTO-RTO: 0 /100 WBC (ref 0–0.2)
PLATELET # BLD AUTO: 233 10*3/MM3 (ref 140–450)
PMV BLD AUTO: 10.7 FL (ref 6–12)
POTASSIUM SERPL-SCNC: 4.1 MMOL/L (ref 3.5–5.2)
PROT SERPL-MCNC: 6.9 G/DL (ref 6–8.5)
PSA SERPL-MCNC: 7.18 NG/ML (ref 0–4)
RBC # BLD AUTO: 4.94 10*6/MM3 (ref 4.14–5.8)
SODIUM SERPL-SCNC: 139 MMOL/L (ref 136–145)
TRIGL SERPL-MCNC: 85 MG/DL (ref 0–150)
TSH SERPL DL<=0.05 MIU/L-ACNC: 3.11 UIU/ML (ref 0.27–4.2)
VIT B12 BLD-MCNC: 390 PG/ML (ref 211–946)
VLDLC SERPL-MCNC: 17 MG/DL (ref 5–40)
WBC NRBC COR # BLD: 8.34 10*3/MM3 (ref 3.4–10.8)

## 2023-10-11 PROCEDURE — 36415 COLL VENOUS BLD VENIPUNCTURE: CPT

## 2023-10-11 PROCEDURE — 82746 ASSAY OF FOLIC ACID SERUM: CPT

## 2023-10-11 PROCEDURE — 82570 ASSAY OF URINE CREATININE: CPT

## 2023-10-11 PROCEDURE — 80061 LIPID PANEL: CPT

## 2023-10-11 PROCEDURE — 80053 COMPREHEN METABOLIC PANEL: CPT

## 2023-10-11 PROCEDURE — 82306 VITAMIN D 25 HYDROXY: CPT

## 2023-10-11 PROCEDURE — 84153 ASSAY OF PSA TOTAL: CPT

## 2023-10-11 PROCEDURE — 83036 HEMOGLOBIN GLYCOSYLATED A1C: CPT

## 2023-10-11 PROCEDURE — 82043 UR ALBUMIN QUANTITATIVE: CPT

## 2023-10-11 PROCEDURE — 85025 COMPLETE CBC W/AUTO DIFF WBC: CPT

## 2023-10-11 PROCEDURE — 84443 ASSAY THYROID STIM HORMONE: CPT

## 2023-10-11 PROCEDURE — 82607 VITAMIN B-12: CPT

## 2023-10-16 ENCOUNTER — OFFICE VISIT (OUTPATIENT)
Dept: INTERNAL MEDICINE | Facility: CLINIC | Age: 68
End: 2023-10-16
Payer: MEDICARE

## 2023-10-16 VITALS
RESPIRATION RATE: 16 BRPM | HEART RATE: 73 BPM | HEIGHT: 72 IN | SYSTOLIC BLOOD PRESSURE: 140 MMHG | DIASTOLIC BLOOD PRESSURE: 90 MMHG | OXYGEN SATURATION: 100 % | WEIGHT: 256.6 LBS | BODY MASS INDEX: 34.75 KG/M2 | TEMPERATURE: 97.7 F

## 2023-10-16 DIAGNOSIS — Z23 NEED FOR INFLUENZA VACCINATION: ICD-10-CM

## 2023-10-16 DIAGNOSIS — E55.9 VITAMIN D DEFICIENCY: ICD-10-CM

## 2023-10-16 DIAGNOSIS — I10 PRIMARY HYPERTENSION: ICD-10-CM

## 2023-10-16 DIAGNOSIS — I48.0 PAROXYSMAL ATRIAL FIBRILLATION: ICD-10-CM

## 2023-10-16 DIAGNOSIS — E78.2 MIXED HYPERLIPIDEMIA: ICD-10-CM

## 2023-10-16 DIAGNOSIS — R01.1 SYSTOLIC MURMUR: ICD-10-CM

## 2023-10-16 DIAGNOSIS — R97.20 ELEVATED PROSTATE SPECIFIC ANTIGEN (PSA): ICD-10-CM

## 2023-10-16 DIAGNOSIS — Z00.00 ENCOUNTER FOR SUBSEQUENT ANNUAL WELLNESS VISIT (AWV) IN MEDICARE PATIENT: Primary | ICD-10-CM

## 2023-10-16 PROCEDURE — 1170F FXNL STATUS ASSESSED: CPT

## 2023-10-16 PROCEDURE — 3077F SYST BP >= 140 MM HG: CPT

## 2023-10-16 PROCEDURE — G0008 ADMIN INFLUENZA VIRUS VAC: HCPCS

## 2023-10-16 PROCEDURE — 3044F HG A1C LEVEL LT 7.0%: CPT

## 2023-10-16 PROCEDURE — 1160F RVW MEDS BY RX/DR IN RCRD: CPT

## 2023-10-16 PROCEDURE — 90662 IIV NO PRSV INCREASED AG IM: CPT

## 2023-10-16 PROCEDURE — G0439 PPPS, SUBSEQ VISIT: HCPCS

## 2023-10-16 PROCEDURE — 3080F DIAST BP >= 90 MM HG: CPT

## 2023-10-16 PROCEDURE — 1159F MED LIST DOCD IN RCRD: CPT

## 2023-10-16 NOTE — ASSESSMENT & PLAN NOTE
Pt states he has had an echo within the last year.  He is followed by cardiology.  Continue with cardiology.

## 2023-10-16 NOTE — PROGRESS NOTES
The ABCs of the Annual Wellness Visit  Subsequent Medicare Wellness Visit    Subjective    David Scott is a 68 y.o. male who presents for a Subsequent Medicare Wellness Visit.    The following portions of the patient's history were reviewed and   updated as appropriate: allergies, current medications, past family history, past medical history, past social history, past surgical history, and problem list.    Compared to one year ago, the patient feels his physical   health is the same.    Compared to one year ago, the patient feels his mental   health is the same.    Recent Hospitalizations:  He was not admitted to the hospital during the last year.       Current Medical Providers:  Patient Care Team:  Jelena Mcfarlane APRN as PCP - General (Internal Medicine)  Alisia Cabral APRN as Nurse Practitioner (Nurse Practitioner)  Luz Eid MD as Consulting Physician (Urology)    Outpatient Medications Prior to Visit   Medication Sig Dispense Refill    amLODIPine (NORVASC) 5 MG tablet Take 1 tablet by mouth Daily.      Eliquis 5 MG tablet tablet Take 1 tablet by mouth 2 (Two) Times a Day.      losartan (COZAAR) 50 MG tablet       metoprolol tartrate (LOPRESSOR) 50 MG tablet Take 1 tablet by mouth 2 (Two) Times a Day.      pravastatin (PRAVACHOL) 40 MG tablet Daily.      losartan (COZAAR) 100 MG tablet Take 1 tablet by mouth Daily. 90 tablet 1    methocarbamol (Robaxin) 500 MG tablet Take 1 tablet by mouth 4 (Four) Times a Day Before Meals & at Bedtime As Needed for Muscle Spasms. 30 tablet 0    vitamin D (ERGOCALCIFEROL) 1.25 MG (43509 UT) capsule capsule Take 1 capsule by mouth 1 (One) Time Per Week. 12 capsule 1     No facility-administered medications prior to visit.       No opioid medication identified on active medication list. I have reviewed chart for other potential  high risk medication/s and harmful drug interactions in the elderly.        Aspirin is not on active medication list.  Aspirin use is not  "indicated based on review of current medical condition/s. Risk of harm outweighs potential benefits.  .    Patient Active Problem List   Diagnosis    Colon cancer screening    Allergic rhinitis    High blood pressure    History of colonic polyps    Hyperlipidemia    Vitamin D deficiency    Elevated prostate specific antigen (PSA)    Prostate nodule    Paroxysmal atrial fibrillation    Cardiomegaly    Long term (current) use of anticoagulants    Metabolic syndrome    Mitral valve disorder    Systolic murmur    Benign prostatic hyperplasia without lower urinary tract symptoms    Onychomycosis    Class 2 obesity due to excess calories with body mass index (BMI) of 35.0 to 35.9 in adult    Lumbar radiculopathy     Advance Care Planning   Advance Care Planning     Advance Directive is on file.  ACP discussion was held with the patient during this visit. Patient has an advance directive in EMR which is still valid.      Objective    Vitals:    10/16/23 1102   BP: 140/90   BP Location: Left arm   Patient Position: Sitting   Cuff Size: Large Adult   Pulse: 73   Resp: 16   Temp: 97.7 °F (36.5 °C)   TempSrc: Temporal   SpO2: 100%   Weight: 116 kg (256 lb 9.6 oz)   Height: 182.9 cm (72\")     Estimated body mass index is 34.8 kg/m² as calculated from the following:    Height as of this encounter: 182.9 cm (72\").    Weight as of this encounter: 116 kg (256 lb 9.6 oz).           Does the patient have evidence of cognitive impairment? No    Lab Results   Component Value Date    TRIG 85 10/11/2023    HDL 34 (L) 10/11/2023    LDL 69 10/11/2023    VLDL 17 10/11/2023    HGBA1C 5.60 10/11/2023        HEALTH RISK ASSESSMENT    Smoking Status:  Social History     Tobacco Use   Smoking Status Never    Passive exposure: Never   Smokeless Tobacco Never     Alcohol Consumption:  Social History     Substance and Sexual Activity   Alcohol Use Yes    Comment: light drinker     Fall Risk Screen:    STEADI Fall Risk Assessment was completed, and " patient is at LOW risk for falls.Assessment completed on:10/16/2023    Depression Screening:      10/16/2023    10:59 AM   PHQ-2/PHQ-9 Depression Screening   Little Interest or Pleasure in Doing Things 2-->more than half the days   Feeling Down, Depressed or Hopeless 0-->not at all   Trouble Falling or Staying Asleep, or Sleeping Too Much 1-->several days   Feeling Tired or Having Little Energy 1-->several days   Poor Appetite or Overeating 1-->several days   Feeling Bad about Yourself - or that You are a Failure or Have Let Yourself or Your Family Down 0-->not at all   Trouble Concentrating on Things, Such as Reading the Newspaper or Watching Television 0-->not at all   Moving or Speaking So Slowly that Other People Could Have Noticed? Or the Opposite - Being So Fidgety 0-->not at all   Thoughts that You Would be Better Off Dead or of Hurting Yourself in Some Way 0-->not at all   PHQ-9: Brief Depression Severity Measure Score 5       Health Habits and Functional and Cognitive Screening:      10/16/2023    10:58 AM   Functional & Cognitive Status   Do you have difficulty preparing food and eating? No   Do you have difficulty bathing yourself, getting dressed or grooming yourself? No   Do you have difficulty using the toilet? No   Do you have difficulty moving around from place to place? No   Do you have trouble with steps or getting out of a bed or a chair? No   Current Diet Frequent Junk Food   Dental Exam Up to date   Eye Exam Up to date   Exercise (times per week) 2 times per week   Current Exercises Include Gardening;Walking;House Cleaning;Home Fitness Gym   Do you need help using the phone?  No   Are you deaf or do you have serious difficulty hearing?  No   Do you need help to go to places out of walking distance? No   Do you need help shopping? No   Do you need help preparing meals?  No   Do you need help with housework?  No   Do you need help with laundry? No   Do you need help taking your medications? No    Do you need help managing money? No   Do you ever drive or ride in a car without wearing a seat belt? No       Age-appropriate Screening Schedule:  Refer to the list below for future screening recommendations based on patient's age, sex and/or medical conditions. Orders for these recommended tests are listed in the plan section. The patient has been provided with a written plan.    Health Maintenance   Topic Date Due    ZOSTER VACCINE (1 of 2) Never done    INFLUENZA VACCINE  08/01/2023    COVID-19 Vaccine (5 - 2023-24 season) 09/01/2023    BMI FOLLOWUP  01/24/2024    HEMOGLOBIN A1C  04/11/2024    LIPID PANEL  10/11/2024    URINE MICROALBUMIN  10/11/2024    ANNUAL WELLNESS VISIT  10/16/2024    COLORECTAL CANCER SCREENING  04/01/2026    TDAP/TD VACCINES (2 - Td or Tdap) 10/11/2032    HEPATITIS C SCREENING  Completed    Pneumococcal Vaccine 65+  Completed    DIABETIC FOOT EXAM  Discontinued    DIABETIC EYE EXAM  Discontinued                  CMS Preventative Services Quick Reference  Risk Factors Identified During Encounter  Immunizations Discussed/Encouraged: Influenza, Shingrix, and COVID19  Inactivity/Sedentary: Patient was advised to exercise at least 150 minutes a week per CDC recommendations.  Dental Screening Recommended  Vision Screening Recommended  The above risks/problems have been discussed with the patient.  Pertinent information has been shared with the patient in the After Visit Summary.  An After Visit Summary and PPPS were made available to the patient.    Follow Up:   Next Medicare Wellness visit to be scheduled in 1 year.       Additional E&M Note during same encounter follows:  Patient has multiple medical problems which are significant and separately identifiable that require additional work above and beyond the Medicare Wellness Visit.      Chief Complaint  Medicare Wellness-subsequent    Subjective        HPI  David Scott is also being seen today for follow up on chronic disease management.  "    He is doing well overall.    We discussed healthier diet and increasing activity.    He denies any chest pain, soa, palpitations, nausea, vomiting, diarrhea, issues with bowel/bladder function.        Objective   Vital Signs:  /90 (BP Location: Left arm, Patient Position: Sitting, Cuff Size: Large Adult)   Pulse 73   Temp 97.7 °F (36.5 °C) (Temporal)   Resp 16   Ht 182.9 cm (72\")   Wt 116 kg (256 lb 9.6 oz)   SpO2 100%   BMI 34.80 kg/m²     Physical Exam  Vitals and nursing note reviewed.   Constitutional:       Appearance: Normal appearance.   HENT:      Head: Normocephalic.   Eyes:      Extraocular Movements: Extraocular movements intact.      Conjunctiva/sclera: Conjunctivae normal.   Cardiovascular:      Rate and Rhythm: Normal rate and regular rhythm.      Heart sounds: Murmur heard.   Pulmonary:      Effort: Pulmonary effort is normal.      Breath sounds: Normal breath sounds. No wheezing or rales.   Abdominal:      Tenderness: There is no abdominal tenderness.   Musculoskeletal:         General: No swelling. Normal range of motion.   Skin:     General: Skin is warm and dry.   Neurological:      General: No focal deficit present.      Mental Status: He is alert. Mental status is at baseline.   Psychiatric:         Mood and Affect: Mood normal.         Behavior: Behavior normal.         Thought Content: Thought content normal.         Judgment: Judgment normal.          The following data was reviewed by: NORA Harley on 10/16/2023:  CMP          3/20/2023    07:58 8/21/2023    11:36 10/11/2023    09:43   CMP   Glucose 109  89  99    BUN 11  11  11    Creatinine 0.89  0.77  0.76    EGFR 94.5  98.7  98.5    Sodium 144  136  139    Potassium 4.9  4.1  4.1    Chloride 105  100  103    Calcium 10.3  9.5  9.4    Total Protein 7.4  6.9  6.9    Albumin 4.5  4.1  4.3    Globulin 2.9  2.8  2.6    Total Bilirubin 1.1  1.1  1.0    Alkaline Phosphatase 87  78  78    AST (SGOT) 14  19  16    ALT (SGPT) " 18  18  14    Albumin/Globulin Ratio 1.6  1.5  1.7    BUN/Creatinine Ratio 12.4  14.3  14.5    Anion Gap 9.6  10.2  8.3      CBC w/diff          3/20/2023    07:58 10/11/2023    09:43   CBC w/Diff   WBC 9.07  8.34    RBC 5.25  4.94    Hemoglobin 15.6  14.7    Hematocrit 45.5  42.5    MCV 86.7  86.0    MCH 29.7  29.8    MCHC 34.3  34.6    RDW 12.9  12.8    Platelets 261  233    Neutrophil Rel % 58.9  61.9    Immature Granulocyte Rel % 0.1  0.2    Lymphocyte Rel % 30.3  26.9    Monocyte Rel % 6.7  6.1    Eosinophil Rel % 3.1  4.2    Basophil Rel % 0.9  0.7      Lipid Panel          3/20/2023    07:58 8/21/2023    11:36 10/11/2023    09:43   Lipid Panel   Total Cholesterol 115  113  120    Triglycerides 119  83  85    HDL Cholesterol 32  31  34    VLDL Cholesterol 22  17  17    LDL Cholesterol  61  65  69    LDL/HDL Ratio 1.85  2.11  2.03      TSH          3/20/2023    07:58 10/11/2023    09:43   TSH   TSH 3.790  3.110      Most Recent A1C          10/11/2023    09:43   HGBA1C Most Recent   Hemoglobin A1C 5.60      UA          3/20/2023    07:58   Urinalysis   Squamous Epithelial Cells, UA None Seen    Specific Jenn[place], UA 1.021    Ketones, UA Negative    Blood, UA Negative    Leukocytes, UA Negative    Nitrite, UA Negative    RBC, UA None Seen    WBC, UA 0-2    Bacteria, UA None Seen                 Assessment and Plan   Diagnoses and all orders for this visit:    1. Encounter for subsequent annual wellness visit (AWV) in Medicare patient (Primary)    2. Paroxysmal atrial fibrillation  Assessment & Plan:  Patient with history of afib.   He denies any chest pain, soa, palpitations, or dizziness.  Continue with eliquis 5mg BID for CVA prevention and Toprol 50 mg twice daily for rate control.   Follow up with cardiology as scheduled.      3. Mixed hyperlipidemia  Assessment & Plan:  Well controlled  Continue pravastatin 40 mg daily.    Orders:  -     CBC & Differential; Future  -     Comprehensive Metabolic Panel;  Future  -     Lipid Panel; Future  -     TSH Rfx On Abnormal To Free T4; Future  -     Vitamin B12 & Folate; Future    4. Primary hypertension  Assessment & Plan:  Blood pressure is elevated today. 134/86 when I checked it manually.   Patient states that when he goes to see his cardiologist it is always normal. Running 120's/80's.  Recommend keeping a blood pressure log.  If BP remains elevated, recommend he follow up with his cardiologist.     Orders:  -     CBC & Differential; Future  -     Comprehensive Metabolic Panel; Future  -     Lipid Panel; Future  -     TSH Rfx On Abnormal To Free T4; Future  -     Vitamin B12 & Folate; Future    5. Elevated prostate specific antigen (PSA)  Assessment & Plan:  Followed by urology.  Patient states that they are considering repeating a MRI of the prostate if numbers remain elevated.  Continue with urology.      6. Vitamin D deficiency  Assessment & Plan:  Vit D is back to normal  He has finished 16558 units once weekly.   Recommend starting vit D 1000 units once daily.     Orders:  -     Vitamin D,25-Hydroxy; Future    7. Systolic murmur  Assessment & Plan:  Pt states he has had an echo within the last year.  He is followed by cardiology.  Continue with cardiology.       8. Need for influenza vaccination             Follow Up   Return in about 6 months (around 4/16/2024) for Recheck.  Patient was given instructions and counseling regarding his condition or for health maintenance advice. Please see specific information pulled into the AVS if appropriate.

## 2023-10-16 NOTE — ASSESSMENT & PLAN NOTE
Patient with history of afib.   He denies any chest pain, soa, palpitations, or dizziness.  Continue with eliquis 5mg BID for CVA prevention and Toprol 50 mg twice daily for rate control.   Follow up with cardiology as scheduled.

## 2023-10-16 NOTE — ASSESSMENT & PLAN NOTE
Followed by urology.  Patient states that they are considering repeating a MRI of the prostate if numbers remain elevated.  Continue with urology.

## 2023-10-16 NOTE — ASSESSMENT & PLAN NOTE
Blood pressure is elevated today. 134/86 when I checked it manually.   Patient states that when he goes to see his cardiologist it is always normal. Running 120's/80's.  Recommend keeping a blood pressure log.  If BP remains elevated, recommend he follow up with his cardiologist.

## 2023-12-13 ENCOUNTER — HOSPITAL ENCOUNTER (OUTPATIENT)
Dept: GENERAL RADIOLOGY | Facility: HOSPITAL | Age: 68
Discharge: HOME | End: 2023-12-13
Admitting: INTERNAL MEDICINE
Payer: MEDICARE

## 2023-12-13 ENCOUNTER — OFFICE VISIT (OUTPATIENT)
Dept: INTERNAL MEDICINE | Facility: CLINIC | Age: 68
End: 2023-12-13
Payer: MEDICARE

## 2023-12-13 VITALS
DIASTOLIC BLOOD PRESSURE: 85 MMHG | SYSTOLIC BLOOD PRESSURE: 136 MMHG | OXYGEN SATURATION: 96 % | WEIGHT: 250 LBS | BODY MASS INDEX: 33.86 KG/M2 | HEART RATE: 74 BPM | TEMPERATURE: 97.3 F | HEIGHT: 72 IN | RESPIRATION RATE: 18 BRPM

## 2023-12-13 DIAGNOSIS — J18.9 PNEUMONIA OF BOTH LOWER LOBES DUE TO INFECTIOUS ORGANISM: Primary | ICD-10-CM

## 2023-12-13 DIAGNOSIS — J18.9 PNEUMONIA OF BOTH LOWER LOBES DUE TO INFECTIOUS ORGANISM: ICD-10-CM

## 2023-12-13 LAB
EXPIRATION DATE: NORMAL
FLUAV AG UPPER RESP QL IA.RAPID: NOT DETECTED
FLUAV SUBTYP SPEC NAA+PROBE: NOT DETECTED
FLUBV AG UPPER RESP QL IA.RAPID: NOT DETECTED
FLUBV RNA ISLT QL NAA+PROBE: NOT DETECTED
INTERNAL CONTROL: NORMAL
Lab: NORMAL
RSV RNA NPH QL NAA+NON-PROBE: NOT DETECTED
SARS-COV-2 AG UPPER RESP QL IA.RAPID: NOT DETECTED
SARS-COV-2 RNA RESP QL NAA+PROBE: NOT DETECTED

## 2023-12-13 PROCEDURE — 3044F HG A1C LEVEL LT 7.0%: CPT | Performed by: INTERNAL MEDICINE

## 2023-12-13 PROCEDURE — 71046 X-RAY EXAM CHEST 2 VIEWS: CPT

## 2023-12-13 PROCEDURE — 3079F DIAST BP 80-89 MM HG: CPT | Performed by: INTERNAL MEDICINE

## 2023-12-13 PROCEDURE — 87637 SARSCOV2&INF A&B&RSV AMP PRB: CPT | Performed by: INTERNAL MEDICINE

## 2023-12-13 PROCEDURE — 3075F SYST BP GE 130 - 139MM HG: CPT | Performed by: INTERNAL MEDICINE

## 2023-12-13 PROCEDURE — 99213 OFFICE O/P EST LOW 20 MIN: CPT | Performed by: INTERNAL MEDICINE

## 2023-12-13 RX ORDER — AZITHROMYCIN 250 MG/1
TABLET, FILM COATED ORAL
Qty: 6 TABLET | Refills: 0 | Status: SHIPPED | OUTPATIENT
Start: 2023-12-13 | End: 2023-12-18

## 2023-12-13 RX ORDER — ALBUTEROL SULFATE 90 UG/1
2 AEROSOL, METERED RESPIRATORY (INHALATION)
Qty: 8.5 G | Refills: 5 | Status: SHIPPED | OUTPATIENT
Start: 2023-12-13

## 2023-12-13 NOTE — PROGRESS NOTES
"CHIEF COMPLAINT/ HPI:  Cough (About a week ago), Fever (Last fever on sunday), Nasal Congestion, Headache, and URI (Chest congestion/ coughing up phlem)    General malaise coughing sinus congestion fevers for the couple days this past week, still not feeling well in general,          Objective   Vital Signs  Vitals:    12/13/23 1009   BP: 136/85   BP Location: Left arm   Patient Position: Sitting   Cuff Size: Large Adult   Pulse: 74   Resp: 18   Temp: 97.3 °F (36.3 °C)   TempSrc: Temporal   SpO2: 96%   Weight: 113 kg (250 lb)   Height: 182.9 cm (72.01\")      Body mass index is 33.9 kg/m².  Review of Systems no associated diarrhea or vomiting or nausea,  Physical Exam nose shows pale edematous mucosa throat shows no exudates otherwise clear ears show slight redness and fluid behind the right tympanic membrane, he is got coarse rhonchi inspiratory expiratory crackles and diffuse expiratory wheezes bilateral posterior, cardiac exam regular rhythm  Result Review :   No results found for: \"PROBNP\", \"BNP\"  CMP          3/20/2023    07:58 8/21/2023    11:36 10/11/2023    09:43   CMP   Glucose 109  89  99    BUN 11  11  11    Creatinine 0.89  0.77  0.76    EGFR 94.5  98.7  98.5    Sodium 144  136  139    Potassium 4.9  4.1  4.1    Chloride 105  100  103    Calcium 10.3  9.5  9.4    Total Protein 7.4  6.9  6.9    Albumin 4.5  4.1  4.3    Globulin 2.9  2.8  2.6    Total Bilirubin 1.1  1.1  1.0    Alkaline Phosphatase 87  78  78    AST (SGOT) 14  19  16    ALT (SGPT) 18  18  14    Albumin/Globulin Ratio 1.6  1.5  1.7    BUN/Creatinine Ratio 12.4  14.3  14.5    Anion Gap 9.6  10.2  8.3      CBC w/diff          3/20/2023    07:58 10/11/2023    09:43   CBC w/Diff   WBC 9.07  8.34    RBC 5.25  4.94    Hemoglobin 15.6  14.7    Hematocrit 45.5  42.5    MCV 86.7  86.0    MCH 29.7  29.8    MCHC 34.3  34.6    RDW 12.9  12.8    Platelets 261  233    Neutrophil Rel % 58.9  61.9    Immature Granulocyte Rel % 0.1  0.2    Lymphocyte Rel % " 30.3  26.9    Monocyte Rel % 6.7  6.1    Eosinophil Rel % 3.1  4.2    Basophil Rel % 0.9  0.7       Lipid Panel          3/20/2023    07:58 8/21/2023    11:36 10/11/2023    09:43   Lipid Panel   Total Cholesterol 115  113  120    Triglycerides 119  83  85    HDL Cholesterol 32  31  34    VLDL Cholesterol 22  17  17    LDL Cholesterol  61  65  69    LDL/HDL Ratio 1.85  2.11  2.03       Lab Results   Component Value Date    TSH 3.110 10/11/2023    TSH 3.790 03/20/2023    TSH 3.740 09/29/2022      Lab Results   Component Value Date    FREET4 1.36 09/29/2022    FREET4 1.27 11/12/2021    FREET4 1.1 05/19/2021      A1C Last 3 Results          3/20/2023    07:58 10/11/2023    09:43   HGBA1C Last 3 Results   Hemoglobin A1C 5.60  5.60       PSA          7/5/2023    09:09 8/21/2023    11:36 10/11/2023    09:43   PSA   PSA 5.670  7.190  7.180                     Visit Diagnoses:    ICD-10-CM ICD-9-CM   1. Pneumonia of both lower lobes due to infectious organism  J18.9 486       Assessment and Plan   Diagnoses and all orders for this visit:    1. Pneumonia of both lower lobes due to infectious organism (Primary)  -     XR Chest PA & Lateral; Future    Other orders  -     albuterol sulfate  (90 Base) MCG/ACT inhaler; Inhale 2 puffs 4 (Four) Times a Day.  Dispense: 8.5 g; Refill: 5  -     azithromycin (Zithromax Z-Shantanu) 250 MG tablet; Take 2 tablets the first day, then 1 tablet daily for 4 days.  Dispense: 6 tablet; Refill: 0        Upper respiratory infection versus pneumonia, will do chest x-ray, empiric antibiotics will test for COVID and flu, RSV today, patient is to start using Mucinex DM,             Follow Up   No follow-ups on file.  Patient was given instructions and counseling regarding his condition or for health maintenance advice. Please see specific information pulled into the AVS if appropriate.

## 2023-12-14 ENCOUNTER — TELEPHONE (OUTPATIENT)
Dept: INTERNAL MEDICINE | Facility: CLINIC | Age: 68
End: 2023-12-14
Payer: MEDICARE

## 2023-12-14 NOTE — TELEPHONE ENCOUNTER
Call patient time his chest x-ray did not show any pneumonia or acute process but I want him to finish the antibiotics and the medicine that we gave him yesterday

## 2024-01-02 ENCOUNTER — LAB (OUTPATIENT)
Dept: LAB | Facility: HOSPITAL | Age: 69
End: 2024-01-02
Payer: MEDICARE

## 2024-01-02 DIAGNOSIS — R97.20 ELEVATED PSA: ICD-10-CM

## 2024-01-02 LAB — PSA SERPL-MCNC: 7.64 NG/ML (ref 0–4)

## 2024-01-02 PROCEDURE — 84153 ASSAY OF PSA TOTAL: CPT

## 2024-01-02 PROCEDURE — 36415 COLL VENOUS BLD VENIPUNCTURE: CPT

## 2024-01-11 ENCOUNTER — OFFICE VISIT (OUTPATIENT)
Dept: UROLOGY | Facility: CLINIC | Age: 69
End: 2024-01-11
Payer: MEDICARE

## 2024-01-11 VITALS
SYSTOLIC BLOOD PRESSURE: 138 MMHG | BODY MASS INDEX: 34.03 KG/M2 | HEART RATE: 73 BPM | WEIGHT: 251 LBS | DIASTOLIC BLOOD PRESSURE: 85 MMHG

## 2024-01-11 DIAGNOSIS — R97.20 ELEVATED PSA: Primary | ICD-10-CM

## 2024-01-11 DIAGNOSIS — R35.1 BENIGN PROSTATIC HYPERPLASIA WITH NOCTURIA: ICD-10-CM

## 2024-01-11 DIAGNOSIS — N40.1 BENIGN PROSTATIC HYPERPLASIA WITH NOCTURIA: ICD-10-CM

## 2024-01-11 RX ORDER — DOXAZOSIN MESYLATE 4 MG/1
4 TABLET ORAL NIGHTLY
Qty: 90 TABLET | Refills: 0 | Status: SHIPPED | OUTPATIENT
Start: 2024-01-11 | End: 2024-04-10

## 2024-01-11 NOTE — PROGRESS NOTES
UROLOGY OFFICE follow-up NOTE    Subjective   HPI  David Scott is a 68 y.o. male.   Presents for follow-up of elevated PSA.  Last seen by urology ARNP, Alisia Moser, 9/29/2022 for elevated PSA.     He denies any urinary issues, leakage, or urgency. His stream is weak, but he is able to urinate.     The patient's oldest brother, age 84 was diagnosed with prostate cancer about 1 to 2 years ago treated with low dose radiation. He denies any family history of prostate enlargement.     The patient put his back out about 3 months ago and asks if that could have elevated his PSA.     Update 7/13/2023: Patient presents for follow-up elevated PSA with PSA prior.  The patient is doing well. He denies any major changes or concerns since his last visit. The patient's PSA has decreased from 8.6 to 5.67.   He is urinating well. He gets up a few times during the night to urinate, but most of the time he does not have a real urgency.  Not bothersome enough to start a medication.    Update 1/11/24:  Patient presents for follow-up elevated PSA, BPH with PSA prior. C/o nocturia 3 sometimes less, sometimes more; progressive weak stream since last visit.  More bothersome now than at previous visits.  Willing to try medication.  Not sexually active.  Had prior MRI, states he is willing to do again however did have some claustrophobia.     ___________  PSA  1/2/24: 7.64  10/11/23: 7.18  8/21/23: 7.19  7/5/2023: 5.67  3/20/2023: 8.68  8/4/2022: 6.36  3/23/2022: 5.47  8/25/2021: 5.58  1/29/2021: 4.61  7/30/2020: 5.43  8/1/2019: 3.84     MRI prostate, 9/25/2021 ([facility]) 108 mL gland; no focal abnormalities suspicious for clinically significant prostate cancer, prostate enlargement with BPH        Results for orders placed or performed in visit on 01/02/24   PSA DIAGNOSTIC    Specimen: Blood   Result Value Ref Range    PSA 7.640 (H) 0.000 - 4.000 ng/mL       Review of systems  A review of systems was performed, and positive findings are  noted in the HPI.    Objective     Vital Signs:   /85   Pulse 73   Wt 114 kg (251 lb)   BMI 34.03 kg/m²       Physical exam  No acute distress, well-nourished  Awake alert and oriented  Mood normal; affect normal    Problem List:  Patient Active Problem List   Diagnosis    Colon cancer screening    Allergic rhinitis    High blood pressure    History of colonic polyps    Hyperlipidemia    Vitamin D deficiency    Elevated prostate specific antigen (PSA)    Prostate nodule    Paroxysmal atrial fibrillation    Cardiomegaly    Long term (current) use of anticoagulants    Metabolic syndrome    Mitral valve disorder    Systolic murmur    Benign prostatic hyperplasia without lower urinary tract symptoms    Onychomycosis    Class 2 obesity due to excess calories with body mass index (BMI) of 35.0 to 35.9 in adult    Lumbar radiculopathy    Pneumonia of both lower lobes due to infectious organism       Assessment & Plan   Diagnoses and all orders for this visit:    1. Elevated PSA (Primary)  -     MRI Prostate With & Without Contrast; Future    2. Benign prostatic hyperplasia with nocturia  -     doxazosin (Cardura) 4 MG tablet; Take 1 tablet by mouth Every Night for 90 days.  Dispense: 90 tablet; Refill: 0      Lower urinary tract symptoms more bothersome than at previous visit.  Initiate trial of medication via Cardura nightly.  Side effects discussed including dizziness and lightheadedness.  Also discussed possibility of retrograde ejaculation.  Agreeable to trial.  Sent to pharmacy    PSA trend discussed with patient at length; continuing elevation of PSA trend despite monitoring.  Recommend further workup at this point via MRI prostate.  This is recommended more than performing standard biopsy particularly given his known prostatomegaly.  Patient agreeable to repeat MRI of the prostate.  Offered patient antianxiety, Valium medication for him to take prior to the MRI but he declines.    Follow-up in about 4 to 6  weeks medication trial, MRI prior  All questions addressed

## 2024-01-12 ENCOUNTER — TELEPHONE (OUTPATIENT)
Dept: UROLOGY | Facility: CLINIC | Age: 69
End: 2024-01-12
Payer: MEDICARE

## 2024-01-12 NOTE — TELEPHONE ENCOUNTER
"----- Message from Kristyn Vogel sent at 1/12/2024  3:18 PM EST -----  Regarding: NEEDS F/U  PLEASE CALL PT TO SCHED \"F/U W/ PROSTATE MRI.\" HE'S HAVING THE IMAGING ON 3/2, SO SOMETHING THAT NEXT WEEK IS FINE. HE'S EXPECTING YOUR CALL. THANK YOU!    "

## 2024-03-12 ENCOUNTER — OFFICE VISIT (OUTPATIENT)
Dept: UROLOGY | Facility: CLINIC | Age: 69
End: 2024-03-12
Payer: MEDICARE

## 2024-03-12 VITALS
HEIGHT: 72 IN | DIASTOLIC BLOOD PRESSURE: 73 MMHG | HEART RATE: 73 BPM | SYSTOLIC BLOOD PRESSURE: 127 MMHG | WEIGHT: 257.8 LBS | BODY MASS INDEX: 34.92 KG/M2

## 2024-03-12 DIAGNOSIS — R35.1 BENIGN PROSTATIC HYPERPLASIA WITH NOCTURIA: ICD-10-CM

## 2024-03-12 DIAGNOSIS — N40.1 BENIGN PROSTATIC HYPERPLASIA WITH NOCTURIA: ICD-10-CM

## 2024-03-12 DIAGNOSIS — R97.20 ELEVATED PSA: Primary | ICD-10-CM

## 2024-03-12 LAB — URINE VOLUME: 0

## 2024-03-12 RX ORDER — DOXAZOSIN MESYLATE 4 MG/1
4 TABLET ORAL NIGHTLY
Qty: 90 TABLET | Refills: 3 | Status: SHIPPED | OUTPATIENT
Start: 2024-03-12 | End: 2025-03-07

## 2024-03-12 NOTE — PROGRESS NOTES
UROLOGY OFFICE FOLLOW-UP NOTE    Subjective   HPI  David Scott is a 68 y.o. male. Presents for follow-up of elevated PSA.  Last seen by urology ARNP, Alisia Moser, 9/29/2022 for elevated PSA.     He denies any urinary issues, leakage, or urgency. His stream is weak, but he is able to urinate.     The patient's oldest brother, age 84 was diagnosed with prostate cancer about 1 to 2 years ago treated with low dose radiation. He denies any family history of prostate enlargement.     The patient put his back out about 3 months ago and asks if that could have elevated his PSA.      Update 7/13/2023: Patient presents for follow-up elevated PSA with PSA prior.  The patient is doing well. He denies any major changes or concerns since his last visit. The patient's PSA has decreased from 8.6 to 5.67.   He is urinating well. He gets up a few times during the night to urinate, but most of the time he does not have a real urgency.  Not bothersome enough to start a medication.     Update 1/11/24:  Patient presents for follow-up elevated PSA, BPH with PSA prior. C/o nocturia 3 sometimes less, sometimes more; progressive weak stream since last visit.  More bothersome now than at previous visits.  Willing to try medication.  Not sexually active.  Had prior MRI, states he is willing to do again however did have some claustrophobia.    Update 3/12/24: Patient presents for follow-up elevated PSA, BPH with MRI prior.   He thinks the Cardura helps him have a stronger stream. He does not take it every night but takes it as needed. He likes being on it than not being on it at times.     ___________  MRI prostate 3/2/2024: 132 cc; No suspicious lesions per PI-RADS criteria     PSA  1/2/24: 7.64 (PSA density 0.05)  10/11/23: 7.18  8/21/23: 7.19  7/5/2023: 5.67  3/20/2023: 8.68  8/4/2022: 6.36  3/23/2022: 5.47  8/25/2021: 5.58  1/29/2021: 4.61  7/30/2020: 5.43  8/1/2019: 3.84     MRI prostate, 9/25/2021 ([facility]) 108 mL gland; no focal  "abnormalities suspicious for clinically significant prostate cancer, prostate enlargement with BPH            Results for orders placed or performed in visit on 03/12/24   Bladder Scan   Result Value Ref Range    Urine Volume 0        Review of systems  A review of systems was performed, and positive findings are noted in the HPI.    Objective     Vital Signs:   /73   Pulse 73   Ht 182.9 cm (72\")   Wt 117 kg (257 lb 12.8 oz)   BMI 34.96 kg/m²       Physical exam  No acute distress, well-nourished  Awake alert and oriented  Mood normal; affect normal    Bladder Scan interpretation 03/12/2024    Estimation of residual urine via BVI 3000 [place] Bladder Scan  Performed by: Linda Lorenzo RN  Residual Urine: 0 ml  Indication: Elevated PSA    Benign prostatic hyperplasia with nocturia   Position: Supine  Examination: Incremental scanning of the suprapubic area using 2.0 MHz transducer using copious amounts of acoustic gel.   Findings: An anechoic area was demonstrated which represented the bladder, with measurement of residual urine as noted. I inspected this myself. In that the residual urine was stable or insignificant, refer to plan for treatment and plan necessary at this time.     Problem List:  Patient Active Problem List   Diagnosis    Colon cancer screening    Allergic rhinitis    High blood pressure    History of colonic polyps    Hyperlipidemia    Vitamin D deficiency    Elevated prostate specific antigen (PSA)    Prostate nodule    Paroxysmal atrial fibrillation    Cardiomegaly    Long term (current) use of anticoagulants    Metabolic syndrome    Mitral valve disorder    Systolic murmur    Benign prostatic hyperplasia without lower urinary tract symptoms    Onychomycosis    Class 2 obesity due to excess calories with body mass index (BMI) of 35.0 to 35.9 in adult    Lumbar radiculopathy    Pneumonia of both lower lobes due to infectious organism       Assessment & Plan   Diagnoses and all orders for " this visit:    1. Elevated PSA (Primary)  -     PSA DIAGNOSTIC; Future  -     PSA DIAGNOSTIC; Future    2. Benign prostatic hyperplasia with nocturia  -     Bladder Scan  -     doxazosin (Cardura) 4 MG tablet; Take 1 tablet by mouth Every Night for 360 days.  Dispense: 90 tablet; Refill: 3        Emptying bladder without postvoid residual  Symptoms improved on Cardura; refill sent to pharmacy    MRI and with significant prostatomegaly; no concerning lesions  Very low PSA density given prostate volume.  Aware that there is no way that a urologist can rule out prostate cancer; plan to continue to monitor the trend and perform further workup as indicated      He is going to do a 6-month PSA we will notify him of the result; plan for 1-year appointment with a PSA prior. I will also do a PVR. I refilled his medication.    Follow-up 1 year, PVR, PSA prior  All questions addressed        Transcribed from ambient dictation for Luz Eid MD by Amy Garvey.  03/12/24   12:29 EDT    Patient or patient representative verbalized consent to the visit recording.  I have personally performed the services described in this document as transcribed by the above individual, and it is both accurate and complete.

## 2024-04-08 ENCOUNTER — LAB (OUTPATIENT)
Dept: LAB | Facility: HOSPITAL | Age: 69
End: 2024-04-08
Payer: MEDICARE

## 2024-04-08 DIAGNOSIS — E55.9 VITAMIN D DEFICIENCY: ICD-10-CM

## 2024-04-08 DIAGNOSIS — E78.2 MIXED HYPERLIPIDEMIA: ICD-10-CM

## 2024-04-08 DIAGNOSIS — I10 PRIMARY HYPERTENSION: ICD-10-CM

## 2024-04-08 LAB
25(OH)D3 SERPL-MCNC: 30.5 NG/ML (ref 30–100)
ALBUMIN SERPL-MCNC: 4.1 G/DL (ref 3.5–5.2)
ALBUMIN/GLOB SERPL: 1.6 G/DL
ALP SERPL-CCNC: 77 U/L (ref 39–117)
ALT SERPL W P-5'-P-CCNC: 18 U/L (ref 1–41)
ANION GAP SERPL CALCULATED.3IONS-SCNC: 10.5 MMOL/L (ref 5–15)
AST SERPL-CCNC: 21 U/L (ref 1–40)
BASOPHILS # BLD AUTO: 0.06 10*3/MM3 (ref 0–0.2)
BASOPHILS NFR BLD AUTO: 0.9 % (ref 0–1.5)
BILIRUB SERPL-MCNC: 0.9 MG/DL (ref 0–1.2)
BUN SERPL-MCNC: 9 MG/DL (ref 8–23)
BUN/CREAT SERPL: 11.4 (ref 7–25)
CALCIUM SPEC-SCNC: 9 MG/DL (ref 8.6–10.5)
CHLORIDE SERPL-SCNC: 104 MMOL/L (ref 98–107)
CHOLEST SERPL-MCNC: 122 MG/DL (ref 0–200)
CO2 SERPL-SCNC: 26.5 MMOL/L (ref 22–29)
CREAT SERPL-MCNC: 0.79 MG/DL (ref 0.76–1.27)
DEPRECATED RDW RBC AUTO: 39.4 FL (ref 37–54)
EGFRCR SERPLBLD CKD-EPI 2021: 97.4 ML/MIN/1.73
EOSINOPHIL # BLD AUTO: 0.3 10*3/MM3 (ref 0–0.4)
EOSINOPHIL NFR BLD AUTO: 4.3 % (ref 0.3–6.2)
ERYTHROCYTE [DISTWIDTH] IN BLOOD BY AUTOMATED COUNT: 12.5 % (ref 12.3–15.4)
FOLATE SERPL-MCNC: >20 NG/ML (ref 4.78–24.2)
GLOBULIN UR ELPH-MCNC: 2.6 GM/DL
GLUCOSE SERPL-MCNC: 94 MG/DL (ref 65–99)
HCT VFR BLD AUTO: 40 % (ref 37.5–51)
HDLC SERPL-MCNC: 32 MG/DL (ref 40–60)
HGB BLD-MCNC: 13.6 G/DL (ref 13–17.7)
IMM GRANULOCYTES # BLD AUTO: 0.01 10*3/MM3 (ref 0–0.05)
IMM GRANULOCYTES NFR BLD AUTO: 0.1 % (ref 0–0.5)
LDLC SERPL CALC-MCNC: 72 MG/DL (ref 0–100)
LDLC/HDLC SERPL: 2.24 {RATIO}
LYMPHOCYTES # BLD AUTO: 2.22 10*3/MM3 (ref 0.7–3.1)
LYMPHOCYTES NFR BLD AUTO: 32.2 % (ref 19.6–45.3)
MCH RBC QN AUTO: 29.6 PG (ref 26.6–33)
MCHC RBC AUTO-ENTMCNC: 34 G/DL (ref 31.5–35.7)
MCV RBC AUTO: 87.1 FL (ref 79–97)
MONOCYTES # BLD AUTO: 0.43 10*3/MM3 (ref 0.1–0.9)
MONOCYTES NFR BLD AUTO: 6.2 % (ref 5–12)
NEUTROPHILS NFR BLD AUTO: 3.88 10*3/MM3 (ref 1.7–7)
NEUTROPHILS NFR BLD AUTO: 56.3 % (ref 42.7–76)
NRBC BLD AUTO-RTO: 0 /100 WBC (ref 0–0.2)
PLATELET # BLD AUTO: 205 10*3/MM3 (ref 140–450)
PMV BLD AUTO: 10.4 FL (ref 6–12)
POTASSIUM SERPL-SCNC: 4 MMOL/L (ref 3.5–5.2)
PROT SERPL-MCNC: 6.7 G/DL (ref 6–8.5)
RBC # BLD AUTO: 4.59 10*6/MM3 (ref 4.14–5.8)
SODIUM SERPL-SCNC: 141 MMOL/L (ref 136–145)
TRIGL SERPL-MCNC: 91 MG/DL (ref 0–150)
TSH SERPL DL<=0.05 MIU/L-ACNC: 3.35 UIU/ML (ref 0.27–4.2)
VIT B12 BLD-MCNC: 555 PG/ML (ref 211–946)
VLDLC SERPL-MCNC: 18 MG/DL (ref 5–40)
WBC NRBC COR # BLD AUTO: 6.9 10*3/MM3 (ref 3.4–10.8)

## 2024-04-08 PROCEDURE — 80061 LIPID PANEL: CPT

## 2024-04-08 PROCEDURE — 85025 COMPLETE CBC W/AUTO DIFF WBC: CPT

## 2024-04-08 PROCEDURE — 84443 ASSAY THYROID STIM HORMONE: CPT

## 2024-04-08 PROCEDURE — 80053 COMPREHEN METABOLIC PANEL: CPT

## 2024-04-08 PROCEDURE — 82746 ASSAY OF FOLIC ACID SERUM: CPT

## 2024-04-08 PROCEDURE — 36415 COLL VENOUS BLD VENIPUNCTURE: CPT

## 2024-04-08 PROCEDURE — 82607 VITAMIN B-12: CPT

## 2024-04-08 PROCEDURE — 82306 VITAMIN D 25 HYDROXY: CPT

## 2024-04-10 ENCOUNTER — TELEPHONE (OUTPATIENT)
Dept: INTERNAL MEDICINE | Age: 69
End: 2024-04-10
Payer: MEDICARE

## 2024-04-10 NOTE — TELEPHONE ENCOUNTER
[place] [facility] [place]     HUB TO RELAY     Jelena Shawham, NORA  4/9/2024  7:44 AM EDT       Stable labs-thyroid function, cmp, cbc are all normal  Vit d is low end normal-he may add in vit d supplement otc 1000 units daily.

## 2024-04-10 NOTE — TELEPHONE ENCOUNTER
Name: TylerRajivDavid    Relationship: Self    Best Callback Number: [phone]    HUB PROVIDED THE RELAY MESSAGE FROM THE OFFICE   PATIENT VOICED UNDERSTANDING AND HAS NO FURTHER QUESTIONS AT THIS TIME    ADDITIONAL INFORMATION:

## 2024-04-11 ENCOUNTER — TELEPHONE (OUTPATIENT)
Dept: INTERNAL MEDICINE | Age: 69
End: 2024-04-11

## 2024-04-16 ENCOUNTER — OFFICE VISIT (OUTPATIENT)
Dept: INTERNAL MEDICINE | Age: 69
End: 2024-04-16
Payer: MEDICARE

## 2024-04-16 VITALS
DIASTOLIC BLOOD PRESSURE: 72 MMHG | OXYGEN SATURATION: 97 % | WEIGHT: 260 LBS | HEIGHT: 72 IN | BODY MASS INDEX: 35.21 KG/M2 | RESPIRATION RATE: 18 BRPM | SYSTOLIC BLOOD PRESSURE: 118 MMHG | HEART RATE: 66 BPM | TEMPERATURE: 97.6 F

## 2024-04-16 DIAGNOSIS — E66.09 CLASS 2 OBESITY DUE TO EXCESS CALORIES WITHOUT SERIOUS COMORBIDITY WITH BODY MASS INDEX (BMI) OF 35.0 TO 35.9 IN ADULT: ICD-10-CM

## 2024-04-16 DIAGNOSIS — R06.83 SNORING: ICD-10-CM

## 2024-04-16 DIAGNOSIS — I10 PRIMARY HYPERTENSION: ICD-10-CM

## 2024-04-16 DIAGNOSIS — E78.2 MIXED HYPERLIPIDEMIA: ICD-10-CM

## 2024-04-16 DIAGNOSIS — G47.19 OTHER HYPERSOMNIA: ICD-10-CM

## 2024-04-16 DIAGNOSIS — J30.2 SEASONAL ALLERGIES: ICD-10-CM

## 2024-04-16 DIAGNOSIS — E55.9 VITAMIN D DEFICIENCY: ICD-10-CM

## 2024-04-16 DIAGNOSIS — I48.0 PAROXYSMAL ATRIAL FIBRILLATION: Primary | ICD-10-CM

## 2024-04-16 DIAGNOSIS — R53.83 OTHER FATIGUE: ICD-10-CM

## 2024-04-16 DIAGNOSIS — Z29.11 NEED FOR RSV IMMUNIZATION: ICD-10-CM

## 2024-04-16 PROCEDURE — 3078F DIAST BP <80 MM HG: CPT

## 2024-04-16 PROCEDURE — 3074F SYST BP LT 130 MM HG: CPT

## 2024-04-16 PROCEDURE — 1160F RVW MEDS BY RX/DR IN RCRD: CPT

## 2024-04-16 PROCEDURE — 99214 OFFICE O/P EST MOD 30 MIN: CPT

## 2024-04-16 PROCEDURE — G2211 COMPLEX E/M VISIT ADD ON: HCPCS

## 2024-04-16 PROCEDURE — 1159F MED LIST DOCD IN RCRD: CPT

## 2024-04-16 NOTE — ASSESSMENT & PLAN NOTE
lipids were well controlled  Continue pravastatin 40 mg qhs  I did recommend healthier diet and increasing activity to help boost his HDL

## 2024-04-16 NOTE — ASSESSMENT & PLAN NOTE
Patient's (Body mass index is 35.26 kg/m².) indicates that they are obese (BMI >30) with health conditions that include hypertension and dyslipidemias . Weight is worsening. BMI  is above average; BMI management plan is completed. We discussed portion control and increasing exercise.   Patient admits to not exercising and eating the best.   Discussed increasing activity and regular exercise implementation.

## 2024-04-16 NOTE — ASSESSMENT & PLAN NOTE
Asymptomatic at this time.  No complaints of SOA, chest pain, palpitations, irregular heartbeats.   He remains on eliquis 5 mg BID for CVA prevention and metoprolol 50 mg BID for rate control.   Continue current medication regimen.

## 2024-04-16 NOTE — ASSESSMENT & PLAN NOTE
Complaints of seasonal allergies to tree pollen.  Not currently taking anything to remedy.  Recommend daily antihistamine PRN

## 2024-04-16 NOTE — ASSESSMENT & PLAN NOTE
Reviewed current labs.  Patient vitamin D on lower side of normal- recommended pt start taking 1000 mcg daily.

## 2024-04-16 NOTE — ASSESSMENT & PLAN NOTE
Blood pressure is well controlled.  Continue current medication regimen including losartan 50 mg daily.

## 2024-04-16 NOTE — PROGRESS NOTES
Chief Complaint  Follow-up (68 year old male here today for a routine 6 month follow up and lab review. He complains of allergies due to the tree pollen. /He denies any other issues or concerns at this time )    History of Present Illness  SUBJECTIVE  David Scott presents to [facility] to follow up on chronic disease management.     Complaints of weight gain and awakening multiple times at night, feeling fatigued.   Denies regular exercise or healthy eating habits.    He is doing well otherwise.    He denies any chest pain, soa, palpitations, nausea, vomiting, diarrhea, changes to bowel/bladder habits.  He does admit to fatigue and difficulty sleeping. He states that he does think he snores.      Past Medical History:   Diagnosis Date    Chronic allergic rhinitis     Elevated PSA     High blood pressure     Personal history of colonic polyps 01/15/2016      Family History   Problem Relation Age of Onset    Prostate cancer Brother     Urolithiasis Brother     Cancer Father       Past Surgical History:   Procedure Laterality Date    COLONOSCOPY          Current Outpatient Medications:     amLODIPine (NORVASC) 5 MG tablet, Take 1 tablet by mouth Daily., Disp: , Rfl:     Eliquis 5 MG tablet tablet, Take 1 tablet by mouth 2 (Two) Times a Day., Disp: , Rfl:     losartan (COZAAR) 50 MG tablet, , Disp: , Rfl:     metoprolol tartrate (LOPRESSOR) 50 MG tablet, Take 1 tablet by mouth 2 (Two) Times a Day., Disp: , Rfl:     pravastatin (PRAVACHOL) 40 MG tablet, Daily., Disp: , Rfl:     doxazosin (Cardura) 4 MG tablet, Take 1 tablet by mouth Every Night for 360 days. (Patient not taking: Reported on 4/16/2024), Disp: 90 tablet, Rfl: 3    RSVPreF3 Vac Recomb Adjuvanted (AREXVY) 120 MCG/0.5ML reconstituted suspension injection, Inject 0.5 mL into the appropriate muscle as directed by prescriber 1 (One) Time for 1 dose., Disp: 0.5 mL, Rfl: 0    OBJECTIVE  Vital Signs:   /72 (BP Location:  "Left arm, Patient Position: Sitting)   Pulse 66   Temp 97.6 °F (36.4 °C) (Temporal)   Resp 18   Ht 182.9 cm (72\")   Wt 118 kg (260 lb)   SpO2 97%   BMI 35.26 kg/m²    Estimated body mass index is 35.26 kg/m² as calculated from the following:    Height as of this encounter: 182.9 cm (72\").    Weight as of this encounter: 118 kg (260 lb).     Wt Readings from Last 3 Encounters:   04/16/24 118 kg (260 lb)   03/12/24 117 kg (257 lb 12.8 oz)   01/11/24 114 kg (251 lb)     BP Readings from Last 3 Encounters:   04/16/24 118/72   03/12/24 127/73   01/11/24 138/85       Physical Exam  Vitals and nursing note reviewed.   Constitutional:       Appearance: Normal appearance. He is obese.   HENT:      Head: Normocephalic.      Right Ear: Tympanic membrane, ear canal and external ear normal.      Left Ear: Tympanic membrane, ear canal and external ear normal.      Nose: Nose normal.      Mouth/Throat:      Mouth: Mucous membranes are moist.      Pharynx: Oropharynx is clear.   Eyes:      Extraocular Movements: Extraocular movements intact.      Conjunctiva/sclera: Conjunctivae normal.   Cardiovascular:      Rate and Rhythm: Normal rate and regular rhythm.      Heart sounds: Normal heart sounds. No murmur heard.  Pulmonary:      Effort: Pulmonary effort is normal.      Breath sounds: Normal breath sounds. No wheezing or rales.   Abdominal:      General: Bowel sounds are normal.      Palpations: Abdomen is soft.      Tenderness: There is no abdominal tenderness. There is no guarding.   Musculoskeletal:         General: No swelling. Normal range of motion.      Cervical back: Normal range of motion and neck supple.   Skin:     General: Skin is warm and dry.   Neurological:      General: No focal deficit present.      Mental Status: He is alert. Mental status is at baseline.   Psychiatric:         Mood and Affect: Mood normal.         Thought Content: Thought content normal.          Result Review    [facility]          8/21/2023    " 11:36 10/11/2023    09:43 4/8/2024    08:16   CMP   Glucose 89  99  94    BUN 11  11  9    Creatinine 0.77  0.76  0.79    EGFR 98.7  98.5  97.4    Sodium 136  139  141    Potassium 4.1  4.1  4.0    Chloride 100  103  104    Calcium 9.5  9.4  9.0    Total Protein 6.9  6.9  6.7    Albumin 4.1  4.3  4.1    Globulin 2.8  2.6  2.6    Total Bilirubin 1.1  1.0  0.9    Alkaline Phosphatase 78  78  77    AST (SGOT) 19  16  21    ALT (SGPT) 18  14  18    Albumin/Globulin Ratio 1.5  1.7  1.6    BUN/Creatinine Ratio 14.3  14.5  11.4    Anion Gap 10.2  8.3  10.5      CBC w/diff          10/11/2023    09:43 4/8/2024    08:16   CBC w/Diff   WBC 8.34  6.90    RBC 4.94  4.59    Hemoglobin 14.7  13.6    Hematocrit 42.5  40.0    MCV 86.0  87.1    MCH 29.8  29.6    MCHC 34.6  34.0    RDW 12.8  12.5    Platelets 233  205    Neutrophil Rel % 61.9  56.3    Immature Granulocyte Rel % 0.2  0.1    Lymphocyte Rel % 26.9  32.2    Monocyte Rel % 6.1  6.2    Eosinophil Rel % 4.2  4.3    Basophil Rel % 0.7  0.9      Lipid Panel          8/21/2023    11:36 10/11/2023    09:43 4/8/2024    08:16   Lipid Panel   Total Cholesterol 113  120  122    Triglycerides 83  85  91    HDL Cholesterol 31  34  32    VLDL Cholesterol 17  17  18    LDL Cholesterol  65  69  72    LDL/HDL Ratio 2.11  2.03  2.24      TSH          10/11/2023    09:43 4/8/2024    08:16   TSH   TSH 3.110  3.350      Lab Results   Component Value Date    IOSQ25HB 30.5 04/08/2024     Lab Results   Component Value Date    FREET4 1.36 09/29/2022     Lab Results   Component Value Date    MLTPQWSU50 555 04/08/2024       No Images in the past 120 days found..     The above data has been reviewed by NORA Harley 04/16/2024 10:29 EDT.          Patient Care Team:  Jelena Mcfarlane APRN as PCP - General (Internal Medicine)  Alisia Cabral APRN as Nurse Practitioner (Nurse Practitioner)  Luz Eid MD as Consulting Physician (Urology)           ASSESSMENT & PLAN    Diagnoses and all  orders for this visit:    1. Paroxysmal atrial fibrillation (Primary)  Assessment & Plan:  Asymptomatic at this time.  No complaints of SOA, chest pain, palpitations, irregular heartbeats.   He remains on eliquis 5 mg BID for CVA prevention and metoprolol 50 mg BID for rate control.   Continue current medication regimen.     Orders:  -     CBC & Differential; Future  -     Comprehensive Metabolic Panel; Future  -     Vitamin B12 & Folate; Future  -     Lipid Panel; Future  -     TSH+Free T4; Future    2. Primary hypertension  Assessment & Plan:  Blood pressure is well controlled.  Continue current medication regimen including losartan 50 mg daily.     Orders:  -     CBC & Differential; Future  -     Comprehensive Metabolic Panel; Future  -     Vitamin B12 & Folate; Future  -     Lipid Panel; Future  -     TSH+Free T4; Future    3. Mixed hyperlipidemia  Assessment & Plan:   lipids were well controlled  Continue pravastatin 40 mg qhs  I did recommend healthier diet and increasing activity to help boost his HDL    Orders:  -     CBC & Differential; Future  -     Comprehensive Metabolic Panel; Future  -     Vitamin B12 & Folate; Future  -     Lipid Panel; Future  -     TSH+Free T4; Future    4. Other fatigue  Assessment & Plan:  Complaints of fatigue, snoring, difficulty sleeping.   Sent order for in home sleep study.     Orders:  -     Home Sleep Study; Future    5. Seasonal allergies  Assessment & Plan:  Complaints of seasonal allergies to tree pollen.  Not currently taking anything to remedy.  Recommend daily antihistamine PRN      6. Class 2 obesity due to excess calories without serious comorbidity with body mass index (BMI) of 35.0 to 35.9 in adult  Assessment & Plan:  Patient's (Body mass index is 35.26 kg/m².) indicates that they are obese (BMI >30) with health conditions that include hypertension and dyslipidemias . Weight is worsening. BMI  is above average; BMI management plan is completed. We discussed  portion control and increasing exercise.   Patient admits to not exercising and eating the best.   Discussed increasing activity and regular exercise implementation.        7. Snoring  -     Home Sleep Study; Future    8. Other hypersomnia  -     Home Sleep Study; Future    9. Need for RSV immunization  -     RSVPreF3 Vac Recomb Adjuvanted (AREXVY) 120 MCG/0.5ML reconstituted suspension injection; Inject 0.5 mL into the appropriate muscle as directed by prescriber 1 (One) Time for 1 dose.  Dispense: 0.5 mL; Refill: 0    10. Vitamin D deficiency  Assessment & Plan:  Reviewed current labs.  Patient vitamin D on lower side of normal- recommended pt start taking 1000 mcg daily.       Orders:  -     Vitamin D,25-Hydroxy; Future         Tobacco Use: Low Risk  (4/16/2024)    Patient History     Smoking Tobacco Use: Never     Smokeless Tobacco Use: Never     Passive Exposure: Never       Follow Up     Return in about 6 months (around 10/16/2024) for Medicare Wellness.    Please note that portions of this note were completed with a voice recognition program.    Patient was given instructions and counseling regarding his condition or for health maintenance advice. Please see specific information pulled into the AVS if appropriate.   I have reviewed information obtained and documented by others and I have confirmed the accuracy of this documented note.    NORA Harley

## 2024-09-27 ENCOUNTER — TRANSCRIBE ORDERS (OUTPATIENT)
Dept: LAB | Facility: HOSPITAL | Age: 69
End: 2024-09-27
Payer: MEDICARE

## 2024-09-27 ENCOUNTER — LAB (OUTPATIENT)
Dept: LAB | Facility: HOSPITAL | Age: 69
End: 2024-09-27
Payer: MEDICARE

## 2024-09-27 DIAGNOSIS — E78.2 MIXED HYPERLIPIDEMIA: ICD-10-CM

## 2024-09-27 DIAGNOSIS — I10 HYPERTENSION, ESSENTIAL: ICD-10-CM

## 2024-09-27 DIAGNOSIS — E55.9 VITAMIN D DEFICIENCY: ICD-10-CM

## 2024-09-27 DIAGNOSIS — E78.5 HYPERLIPIDEMIA, UNSPECIFIED HYPERLIPIDEMIA TYPE: ICD-10-CM

## 2024-09-27 DIAGNOSIS — I10 PRIMARY HYPERTENSION: ICD-10-CM

## 2024-09-27 DIAGNOSIS — R97.20 ELEVATED PSA: ICD-10-CM

## 2024-09-27 DIAGNOSIS — I48.0 PAROXYSMAL ATRIAL FIBRILLATION: ICD-10-CM

## 2024-09-27 DIAGNOSIS — I48.91 ATRIAL FIBRILLATION, UNSPECIFIED TYPE: ICD-10-CM

## 2024-09-27 DIAGNOSIS — I48.91 ATRIAL FIBRILLATION, UNSPECIFIED TYPE: Primary | ICD-10-CM

## 2024-09-27 LAB
25(OH)D3 SERPL-MCNC: 26.7 NG/ML (ref 30–100)
ALBUMIN SERPL-MCNC: 4.1 G/DL (ref 3.5–5.2)
ALBUMIN/GLOB SERPL: 1.5 G/DL
ALP SERPL-CCNC: 73 U/L (ref 39–117)
ALT SERPL W P-5'-P-CCNC: 17 U/L (ref 1–41)
ANION GAP SERPL CALCULATED.3IONS-SCNC: 8.5 MMOL/L (ref 5–15)
AST SERPL-CCNC: 16 U/L (ref 1–40)
BASOPHILS # BLD AUTO: 0.05 10*3/MM3 (ref 0–0.2)
BASOPHILS NFR BLD AUTO: 0.7 % (ref 0–1.5)
BILIRUB SERPL-MCNC: 0.8 MG/DL (ref 0–1.2)
BUN SERPL-MCNC: 12 MG/DL (ref 8–23)
BUN/CREAT SERPL: 15 (ref 7–25)
CALCIUM SPEC-SCNC: 9.3 MG/DL (ref 8.6–10.5)
CHLORIDE SERPL-SCNC: 103 MMOL/L (ref 98–107)
CHOLEST SERPL-MCNC: 119 MG/DL (ref 0–200)
CO2 SERPL-SCNC: 26.5 MMOL/L (ref 22–29)
CREAT SERPL-MCNC: 0.8 MG/DL (ref 0.76–1.27)
DEPRECATED RDW RBC AUTO: 39.5 FL (ref 37–54)
EGFRCR SERPLBLD CKD-EPI 2021: 97 ML/MIN/1.73
EOSINOPHIL # BLD AUTO: 0.27 10*3/MM3 (ref 0–0.4)
EOSINOPHIL NFR BLD AUTO: 3.8 % (ref 0.3–6.2)
ERYTHROCYTE [DISTWIDTH] IN BLOOD BY AUTOMATED COUNT: 12.4 % (ref 12.3–15.4)
FOLATE SERPL-MCNC: >20 NG/ML (ref 4.78–24.2)
GLOBULIN UR ELPH-MCNC: 2.7 GM/DL
GLUCOSE SERPL-MCNC: 100 MG/DL (ref 65–99)
HCT VFR BLD AUTO: 41.7 % (ref 37.5–51)
HDLC SERPL-MCNC: 31 MG/DL (ref 40–60)
HGB BLD-MCNC: 14.3 G/DL (ref 13–17.7)
IMM GRANULOCYTES # BLD AUTO: 0.02 10*3/MM3 (ref 0–0.05)
IMM GRANULOCYTES NFR BLD AUTO: 0.3 % (ref 0–0.5)
LDLC SERPL CALC-MCNC: 72 MG/DL (ref 0–100)
LDLC/HDLC SERPL: 2.34 {RATIO}
LYMPHOCYTES # BLD AUTO: 2.22 10*3/MM3 (ref 0.7–3.1)
LYMPHOCYTES NFR BLD AUTO: 31.3 % (ref 19.6–45.3)
MCH RBC QN AUTO: 30 PG (ref 26.6–33)
MCHC RBC AUTO-ENTMCNC: 34.3 G/DL (ref 31.5–35.7)
MCV RBC AUTO: 87.4 FL (ref 79–97)
MONOCYTES # BLD AUTO: 0.45 10*3/MM3 (ref 0.1–0.9)
MONOCYTES NFR BLD AUTO: 6.3 % (ref 5–12)
NEUTROPHILS NFR BLD AUTO: 4.08 10*3/MM3 (ref 1.7–7)
NEUTROPHILS NFR BLD AUTO: 57.6 % (ref 42.7–76)
NRBC BLD AUTO-RTO: 0 /100 WBC (ref 0–0.2)
PLATELET # BLD AUTO: 201 10*3/MM3 (ref 140–450)
PMV BLD AUTO: 10.5 FL (ref 6–12)
POTASSIUM SERPL-SCNC: 4.1 MMOL/L (ref 3.5–5.2)
PROT SERPL-MCNC: 6.8 G/DL (ref 6–8.5)
PSA SERPL-MCNC: 7.39 NG/ML (ref 0–4)
RBC # BLD AUTO: 4.77 10*6/MM3 (ref 4.14–5.8)
SODIUM SERPL-SCNC: 138 MMOL/L (ref 136–145)
T4 FREE SERPL-MCNC: 1.34 NG/DL (ref 0.92–1.68)
TRIGL SERPL-MCNC: 77 MG/DL (ref 0–150)
TSH SERPL DL<=0.05 MIU/L-ACNC: 2.46 UIU/ML (ref 0.27–4.2)
VIT B12 BLD-MCNC: 551 PG/ML (ref 211–946)
VLDLC SERPL-MCNC: 16 MG/DL (ref 5–40)
WBC NRBC COR # BLD AUTO: 7.09 10*3/MM3 (ref 3.4–10.8)

## 2024-09-27 PROCEDURE — 80053 COMPREHEN METABOLIC PANEL: CPT

## 2024-09-27 PROCEDURE — 82607 VITAMIN B-12: CPT

## 2024-09-27 PROCEDURE — 82306 VITAMIN D 25 HYDROXY: CPT

## 2024-09-27 PROCEDURE — 84443 ASSAY THYROID STIM HORMONE: CPT

## 2024-09-27 PROCEDURE — 84153 ASSAY OF PSA TOTAL: CPT

## 2024-09-27 PROCEDURE — 82746 ASSAY OF FOLIC ACID SERUM: CPT

## 2024-09-27 PROCEDURE — 36415 COLL VENOUS BLD VENIPUNCTURE: CPT

## 2024-09-27 PROCEDURE — 84439 ASSAY OF FREE THYROXINE: CPT

## 2024-09-27 PROCEDURE — 80061 LIPID PANEL: CPT

## 2024-09-27 PROCEDURE — 85025 COMPLETE CBC W/AUTO DIFF WBC: CPT

## 2024-10-01 ENCOUNTER — TELEPHONE (OUTPATIENT)
Dept: UROLOGY | Facility: CLINIC | Age: 69
End: 2024-10-01
Payer: MEDICARE

## 2024-10-01 NOTE — TELEPHONE ENCOUNTER
----- Message from Luz Eid sent at 9/30/2024  1:11 PM EDT -----  Please notify patient that PSA is stable, 7.39 (last PSA 7.64 and 7.18)    Continue as planned, follow-up 6 months with another PSA prior (order is in)    Thank you

## 2024-10-16 NOTE — PROGRESS NOTES
Subjective   The ABCs of the Annual Wellness Visit  Medicare Wellness Visit      David Scott is a 69 y.o. patient who presents for a Medicare Wellness Visit.    The following portions of the patient's history were reviewed and   updated as appropriate: allergies, current medications, past family history, past medical history, past social history, past surgical history, and problem list.    Compared to one year ago, the patient's physical   health is the same.  Compared to one year ago, the patient's mental   health is the same.    Recent Hospitalizations:  He was not admitted to the hospital during the last year.     Current Medical Providers:  Patient Care Team:  Jelena Mcfarlane APRN as PCP - General (Internal Medicine)  Alisia Cabral APRN as Nurse Practitioner (Nurse Practitioner)  Luz Eid MD as Consulting Physician (Urology)    Outpatient Medications Prior to Visit   Medication Sig Dispense Refill    amLODIPine (NORVASC) 5 MG tablet Take 1 tablet by mouth Daily.      doxazosin (Cardura) 4 MG tablet Take 1 tablet by mouth Every Night for 360 days. 90 tablet 3    Eliquis 5 MG tablet tablet Take 1 tablet by mouth 2 (Two) Times a Day.      losartan (COZAAR) 50 MG tablet       metoprolol tartrate (LOPRESSOR) 50 MG tablet Take 1 tablet by mouth 2 (Two) Times a Day.      pravastatin (PRAVACHOL) 40 MG tablet Daily.       No facility-administered medications prior to visit.     No opioid medication identified on active medication list. I have reviewed chart for other potential  high risk medication/s and harmful drug interactions in the elderly.      Aspirin is not on active medication list.  Aspirin use is not indicated based on review of current medical condition/s. Risk of harm outweighs potential benefits.  .    Patient Active Problem List   Diagnosis    Colon cancer screening    Allergic rhinitis    High blood pressure    History of colonic polyps    Hyperlipidemia    Vitamin D deficiency    Elevated  "prostate specific antigen (PSA)    Prostate nodule    Paroxysmal atrial fibrillation    Cardiomegaly    Long term (current) use of anticoagulants    Metabolic syndrome    Mitral valve disorder    Systolic murmur    Benign prostatic hyperplasia without lower urinary tract symptoms    Onychomycosis    Class 2 obesity due to excess calories with body mass index (BMI) of 35.0 to 35.9 in adult    Lumbar radiculopathy    Pneumonia of both lower lobes due to infectious organism    Other fatigue    Seasonal allergies     Advance Care Planning Advance Directive is on file.  ACP discussion was held with the patient during this visit. Patient has an advance directive in EMR which is still valid.             Objective   Vitals:    10/18/24 0925   BP: 116/68   BP Location: Left arm   Patient Position: Sitting   Pulse: 73   Resp: 18   Temp: 98.1 °F (36.7 °C)   TempSrc: Temporal   SpO2: 95%   Weight: 120 kg (263 lb 9.6 oz)   Height: 182.9 cm (72.01\")   PainSc: 0-No pain       Estimated body mass index is 35.74 kg/m² as calculated from the following:    Height as of this encounter: 182.9 cm (72.01\").    Weight as of this encounter: 120 kg (263 lb 9.6 oz).            Does the patient have evidence of cognitive impairment? No  Lab Results   Component Value Date    TRIG 77 09/27/2024    HDL 31 (L) 09/27/2024    LDL 72 09/27/2024    VLDL 16 09/27/2024                                                                                                Health  Risk Assessment    Smoking Status:  Social History     Tobacco Use   Smoking Status Never    Passive exposure: Never   Smokeless Tobacco Never     Alcohol Consumption:  Social History     Substance and Sexual Activity   Alcohol Use Not Currently    Comment: light drinker       Fall Risk Screen  STEADI Fall Risk Assessment was completed, and patient is at LOW risk for falls.Assessment completed on:10/18/2024    Depression Screening:      10/18/2024     9:22 AM   PHQ-2/PHQ-9 Depression " Screening   Little interest or pleasure in doing things Not at all   Feeling down, depressed, or hopeless Not at all     Health Habits and Functional and Cognitive Screening:      10/18/2024     9:09 AM   Functional & Cognitive Status   Do you have difficulty preparing food and eating? No   Do you have difficulty bathing yourself, getting dressed or grooming yourself? No   Do you have difficulty using the toilet? No   Do you have difficulty moving around from place to place? No   Do you have trouble with steps or getting out of a bed or a chair? No   Current Diet Limited Junk Food   Dental Exam Up to date   Eye Exam Not up to date   Exercise (times per week) 3 times per week   Current Exercises Include Yard Work;House Cleaning;Gardening   Do you need help using the phone?  No   Are you deaf or do you have serious difficulty hearing?  No   Do you need help to go to places out of walking distance? No   Do you need help shopping? No   Do you need help preparing meals?  No   Do you need help with housework?  No   Do you need help with laundry? No   Do you need help taking your medications? No   Do you need help managing money? No   Do you ever drive or ride in a car without wearing a seat belt? No   Have you felt unusual stress, anger or loneliness in the last month? No   Who do you live with? Alone   If you need help, do you have trouble finding someone available to you? No   Have you been bothered in the last four weeks by sexual problems? No   Do you have difficulty concentrating, remembering or making decisions? No           Age-appropriate Screening Schedule:  Refer to the list below for future screening recommendations based on patient's age, sex and/or medical conditions. Orders for these recommended tests are listed in the plan section. The patient has been provided with a written plan.    Health Maintenance List  Health Maintenance   Topic Date Due    COVID-19 Vaccine (6 - 2023-24 season) 01/07/2025 (Originally  9/1/2024)    BMI FOLLOWUP  04/16/2025    LIPID PANEL  09/27/2025    ANNUAL WELLNESS VISIT  10/18/2025    COLORECTAL CANCER SCREENING  04/01/2026    TDAP/TD VACCINES (2 - Td or Tdap) 10/11/2032    HEPATITIS C SCREENING  Completed    INFLUENZA VACCINE  Completed    Pneumococcal Vaccine 65+  Completed    ZOSTER VACCINE  Completed                                                                                                                                                CMS Preventative Services Quick Reference  Risk Factors Identified During Encounter  Immunizations Discussed/Encouraged: Influenza, COVID19, and RSV (Respiratory Syncytial Virus)  Inactivity/Sedentary: Patient was advised to exercise at least 150 minutes a week per CDC recommendations. and Patient was advised to do at least 150 minutes a week of moderate intensity activity such as brisk walking and do at least 2 days a week of activities that strengthen muscles such as resistance training and do activities to improve balance such as standing on one foot about 3 days a week.  Dental Screening Recommended  Vision Screening Recommended    The above risks/problems have been discussed with the patient.  Pertinent information has been shared with the patient in the After Visit Summary.  An After Visit Summary and PPPS were made available to the patient.    Follow Up:   Next Medicare Wellness visit to be scheduled in 1 year.          Additional E&M Note during same encounter follows:  Patient has multiple medical problems which are significant and separately identifiable that require additional work above and beyond the Medicare Wellness Visit.      Chief Complaint  Medicare Wellness-subsequent (69 year old male here today for his annual medicare wellness. States that his feet and ankles have been swelling)    David Scott is a 69 y.o. male who presents to [facility] follow up on chronic disease management.  Patient states  "that he does have intermittent leg swelling in his feet and ankles. He denies any shortness of breath or chest pain. He complains of right calf pain as well. He is on eliquis 5 mg BID.       Objective   Vital Signs:   Vitals:    10/18/24 0925   BP: 116/68   BP Location: Left arm   Patient Position: Sitting   Pulse: 73   Resp: 18   Temp: 98.1 °F (36.7 °C)   TempSrc: Temporal   SpO2: 95%   Weight: 120 kg (263 lb 9.6 oz)   Height: 182.9 cm (72.01\")   PainSc: 0-No pain       Wt Readings from Last 3 Encounters:   10/18/24 120 kg (263 lb 9.6 oz)   04/16/24 118 kg (260 lb)   03/12/24 117 kg (257 lb 12.8 oz)     BP Readings from Last 3 Encounters:   10/18/24 116/68   04/16/24 118/72   03/12/24 127/73       Physical Exam  Vitals and nursing note reviewed.   Constitutional:       Appearance: Normal appearance.   HENT:      Head: Normocephalic.   Eyes:      Extraocular Movements: Extraocular movements intact.      Conjunctiva/sclera: Conjunctivae normal.   Cardiovascular:      Rate and Rhythm: Normal rate and regular rhythm.      Heart sounds: Murmur heard.   Pulmonary:      Effort: Pulmonary effort is normal.      Breath sounds: Normal breath sounds. No wheezing or rales.   Abdominal:      General: Bowel sounds are normal.      Palpations: Abdomen is soft.      Tenderness: There is no abdominal tenderness. There is no guarding.   Musculoskeletal:         General: Swelling present. Normal range of motion.      Right lower leg: Edema (2+ pitting, calf tenderness) present.      Left lower leg: Edema (1+ pitting) present.   Skin:     General: Skin is warm and dry.   Neurological:      General: No focal deficit present.      Mental Status: He is alert. Mental status is at baseline.   Psychiatric:         Mood and Affect: Mood normal.         Thought Content: Thought content normal.         The following data was reviewed by NORA Harley on 10/18/2024        Assessment & Plan   Diagnoses and all orders for this visit:    1. " Encounter for subsequent annual wellness visit (AWV) in Medicare patient (Primary)    2. Paroxysmal atrial fibrillation  Assessment & Plan:  Patient denies any dizziness, palpitations or shortness of breath.  He is complaining of increased lower extremity swelling and pain in his right calf.  He is on Eliquis 5 mg twice daily and is compliant with medications.  Right calf is more swollen than the left.  He does also have constant tenderness over the last several weeks.  Will get stat vele    Orders:  -     proBNP; Future  -     CBC & Differential; Future  -     Comprehensive Metabolic Panel; Future  -     Lipid Panel; Future  -     TSH Rfx On Abnormal To Free T4; Future  -     Vitamin B12 & Folate; Future  -     Magnesium; Future    3. Vitamin D deficiency  -     vitamin D (ERGOCALCIFEROL) 1.25 MG (65533 UT) capsule capsule; Take 1 capsule by mouth 1 (One) Time Per Week.  Dispense: 12 capsule; Refill: 1  -     Vitamin D,25-Hydroxy; Future    4. Mixed hyperlipidemia  Assessment & Plan:  Lipids are stable with pravastatin 40 mg nightly.  Also recommend increasing activity to help boost HDL.      Orders:  -     CBC & Differential; Future  -     Comprehensive Metabolic Panel; Future  -     Lipid Panel; Future  -     TSH Rfx On Abnormal To Free T4; Future  -     Vitamin B12 & Folate; Future  -     Magnesium; Future    5. Elevated prostate specific antigen (PSA)  Assessment & Plan:  PSA levels have been stable.  He is managed by urology.  Continue current regimen.      6. Primary hypertension  Assessment & Plan:  Blood pressure is well-controlled with losartan 50 mg daily and Toprol 50 mg twice daily.  He is also on amlodipine 5 mg daily.  Continue current medication regimen.      Orders:  -     CBC & Differential; Future  -     Comprehensive Metabolic Panel; Future  -     Lipid Panel; Future  -     TSH Rfx On Abnormal To Free T4; Future  -     Vitamin B12 & Folate; Future    7. Right calf pain  -     Duplex Venous Lower  Extremity - Right CAR; Future  -     Magnesium; Future    8. Right leg swelling  -     Duplex Venous Lower Extremity - Right CAR; Future  -     Magnesium; Future    9. Mitral valve disorder  -     proBNP; Future    10. Unspecified abnormalities of breathing  -     proBNP; Future    11. Need for influenza vaccination  -     Fluzone High-Dose 65+yrs ([id number])                  FOLLOW UP  Return in about 6 months (around 4/18/2025) for Recheck.  Patient was given instructions and counseling regarding his condition or for health maintenance advice. Please see specific information pulled into the AVS if appropriate.     NORA Harley  10/18/24  10:00 EDT

## 2024-10-18 ENCOUNTER — OFFICE VISIT (OUTPATIENT)
Dept: INTERNAL MEDICINE | Age: 69
End: 2024-10-18
Payer: MEDICARE

## 2024-10-18 ENCOUNTER — HOSPITAL ENCOUNTER (OUTPATIENT)
Dept: CARDIOLOGY | Facility: HOSPITAL | Age: 69
Discharge: HOME | End: 2024-10-18
Payer: MEDICARE

## 2024-10-18 VITALS
DIASTOLIC BLOOD PRESSURE: 68 MMHG | RESPIRATION RATE: 18 BRPM | HEIGHT: 72 IN | BODY MASS INDEX: 35.7 KG/M2 | HEART RATE: 73 BPM | TEMPERATURE: 98.1 F | WEIGHT: 263.6 LBS | SYSTOLIC BLOOD PRESSURE: 116 MMHG | OXYGEN SATURATION: 95 %

## 2024-10-18 DIAGNOSIS — E55.9 VITAMIN D DEFICIENCY: ICD-10-CM

## 2024-10-18 DIAGNOSIS — M79.89 RIGHT LEG SWELLING: ICD-10-CM

## 2024-10-18 DIAGNOSIS — E78.2 MIXED HYPERLIPIDEMIA: ICD-10-CM

## 2024-10-18 DIAGNOSIS — M79.661 RIGHT CALF PAIN: ICD-10-CM

## 2024-10-18 DIAGNOSIS — I10 PRIMARY HYPERTENSION: ICD-10-CM

## 2024-10-18 DIAGNOSIS — I05.9 MITRAL VALVE DISORDER: ICD-10-CM

## 2024-10-18 DIAGNOSIS — R06.9 UNSPECIFIED ABNORMALITIES OF BREATHING: ICD-10-CM

## 2024-10-18 DIAGNOSIS — R97.20 ELEVATED PROSTATE SPECIFIC ANTIGEN (PSA): ICD-10-CM

## 2024-10-18 DIAGNOSIS — I48.0 PAROXYSMAL ATRIAL FIBRILLATION: ICD-10-CM

## 2024-10-18 DIAGNOSIS — Z23 NEED FOR INFLUENZA VACCINATION: ICD-10-CM

## 2024-10-18 DIAGNOSIS — Z00.00 ENCOUNTER FOR SUBSEQUENT ANNUAL WELLNESS VISIT (AWV) IN MEDICARE PATIENT: Primary | ICD-10-CM

## 2024-10-18 LAB
BH CV LOWER VASCULAR LEFT COMMON FEMORAL AUGMENT: NORMAL
BH CV LOWER VASCULAR LEFT COMMON FEMORAL COMPETENT: NORMAL
BH CV LOWER VASCULAR LEFT COMMON FEMORAL COMPRESS: NORMAL
BH CV LOWER VASCULAR LEFT COMMON FEMORAL PHASIC: NORMAL
BH CV LOWER VASCULAR LEFT COMMON FEMORAL SPONT: NORMAL
BH CV LOWER VASCULAR RIGHT COMMON FEMORAL AUGMENT: NORMAL
BH CV LOWER VASCULAR RIGHT COMMON FEMORAL COMPETENT: NORMAL
BH CV LOWER VASCULAR RIGHT COMMON FEMORAL COMPRESS: NORMAL
BH CV LOWER VASCULAR RIGHT COMMON FEMORAL PHASIC: NORMAL
BH CV LOWER VASCULAR RIGHT COMMON FEMORAL SPONT: NORMAL
BH CV LOWER VASCULAR RIGHT DISTAL FEMORAL COMPRESS: NORMAL
BH CV LOWER VASCULAR RIGHT GASTRONEMIUS COMPRESS: NORMAL
BH CV LOWER VASCULAR RIGHT GREATER SAPH AK COMPRESS: NORMAL
BH CV LOWER VASCULAR RIGHT GREATER SAPH BK COMPRESS: NORMAL
BH CV LOWER VASCULAR RIGHT LESSER SAPH COMPRESS: NORMAL
BH CV LOWER VASCULAR RIGHT MID FEMORAL AUGMENT: NORMAL
BH CV LOWER VASCULAR RIGHT MID FEMORAL COMPETENT: NORMAL
BH CV LOWER VASCULAR RIGHT MID FEMORAL COMPRESS: NORMAL
BH CV LOWER VASCULAR RIGHT MID FEMORAL PHASIC: NORMAL
BH CV LOWER VASCULAR RIGHT MID FEMORAL SPONT: NORMAL
BH CV LOWER VASCULAR RIGHT PERONEAL COMPRESS: NORMAL
BH CV LOWER VASCULAR RIGHT POPLITEAL AUGMENT: NORMAL
BH CV LOWER VASCULAR RIGHT POPLITEAL COMPETENT: NORMAL
BH CV LOWER VASCULAR RIGHT POPLITEAL COMPRESS: NORMAL
BH CV LOWER VASCULAR RIGHT POPLITEAL PHASIC: NORMAL
BH CV LOWER VASCULAR RIGHT POPLITEAL SPONT: NORMAL
BH CV LOWER VASCULAR RIGHT POSTERIOR TIBIAL COMPRESS: NORMAL
BH CV LOWER VASCULAR RIGHT PROXIMAL FEMORAL COMPRESS: NORMAL
BH CV LOWER VASCULAR RIGHT SAPHENOFEMORAL JUNCTION COMPRESS: NORMAL

## 2024-10-18 PROCEDURE — 93971 EXTREMITY STUDY: CPT

## 2024-10-18 RX ORDER — ERGOCALCIFEROL 1.25 MG/1
50000 CAPSULE, LIQUID FILLED ORAL WEEKLY
Qty: 12 CAPSULE | Refills: 1 | Status: SHIPPED | OUTPATIENT
Start: 2024-10-18

## 2024-10-18 NOTE — ASSESSMENT & PLAN NOTE
Blood pressure is well-controlled with losartan 50 mg daily and Toprol 50 mg twice daily.  He is also on amlodipine 5 mg daily.  Continue current medication regimen.

## 2024-10-18 NOTE — ASSESSMENT & PLAN NOTE
Lipids are stable with pravastatin 40 mg nightly.  Also recommend increasing activity to help boost HDL.

## 2024-10-18 NOTE — ASSESSMENT & PLAN NOTE
Patient denies any dizziness, palpitations or shortness of breath.  He is complaining of increased lower extremity swelling and pain in his right calf.  He is on Eliquis 5 mg twice daily and is compliant with medications.  Right calf is more swollen than the left.  He does also have constant tenderness over the last several weeks.  Will get stat vele

## 2025-03-11 ENCOUNTER — LAB (OUTPATIENT)
Dept: LAB | Facility: HOSPITAL | Age: 70
End: 2025-03-11
Payer: MEDICARE

## 2025-03-11 DIAGNOSIS — R97.20 ELEVATED PSA: ICD-10-CM

## 2025-03-11 LAB — PSA SERPL-MCNC: 8.96 NG/ML (ref 0–4)

## 2025-03-11 PROCEDURE — 84153 ASSAY OF PSA TOTAL: CPT

## 2025-03-11 PROCEDURE — 36415 COLL VENOUS BLD VENIPUNCTURE: CPT

## 2025-03-18 ENCOUNTER — OFFICE VISIT (OUTPATIENT)
Dept: UROLOGY | Age: 70
End: 2025-03-18
Payer: COMMERCIAL

## 2025-03-18 DIAGNOSIS — R35.1 BENIGN PROSTATIC HYPERPLASIA WITH NOCTURIA: ICD-10-CM

## 2025-03-18 DIAGNOSIS — R97.20 ELEVATED PSA: Primary | ICD-10-CM

## 2025-03-18 DIAGNOSIS — N40.1 BENIGN PROSTATIC HYPERPLASIA WITH NOCTURIA: ICD-10-CM

## 2025-03-18 LAB — URINE VOLUME: 0

## 2025-03-18 RX ORDER — DOXAZOSIN 8 MG/1
8 TABLET ORAL NIGHTLY
Qty: 90 TABLET | Refills: 0 | Status: SHIPPED | OUTPATIENT
Start: 2025-03-18 | End: 2025-06-16

## 2025-03-18 NOTE — PROGRESS NOTES
UROLOGY OFFICE FOLLOW UP NOTE    Subjective   HPI  David Scott is a 69 y.o. male. Presents for follow-up of elevated PSA.  Last seen by urology ARNP, Alisia Moser, 9/29/2022 for elevated PSA.     He denies any urinary issues, leakage, or urgency. His stream is weak, but he is able to urinate.     The patient's oldest brother, age 84 was diagnosed with prostate cancer about 1 to 2 years ago treated with low dose radiation. He denies any family history of prostate enlargement.     The patient put his back out about 3 months ago and asks if that could have elevated his PSA.      Update 7/13/2023: Patient presents for follow-up elevated PSA with PSA prior.  The patient is doing well. He denies any major changes or concerns since his last visit. The patient's PSA has decreased from 8.6 to 5.67.   He is urinating well. He gets up a few times during the night to urinate, but most of the time he does not have a real urgency.  Not bothersome enough to start a medication.     Update 1/11/24:  Patient presents for follow-up elevated PSA, BPH with PSA prior. C/o nocturia 3 sometimes less, sometimes more; progressive weak stream since last visit.  More bothersome now than at previous visits.  Willing to try medication.  Not sexually active.  Had prior MRI, states he is willing to do again however did have some claustrophobia.     Update 3/12/24: Patient presents for follow-up elevated PSA, BPH with MRI prior.   He thinks the Cardura helps him have a stronger stream. He does not take it every night but takes it as needed. He likes being on it than not being on it at times.       Update 3/18/2025: Patient presents for follow-up elevated PSA, BPH with MRI prior.   He thinks the Cardura helps him have a stronger stream.   History of Present Illness  He reports no significant changes in his urinary symptoms, maintaining a satisfactory flow and complete bladder emptying. However, he experiences frequent urination at night,  necessitating hourly awakenings. This disrupts his sleep quality, despite his efforts to limit fluid intake after 6:00 PM. He does not report any increased daytime urinary frequency. His medication regimen includes Cardura, which he takes inconsistently, but he is open to increasing the dosage from 4 mg to 8 mg.        ___________  MRI prostate 3/2/2024: 132 cc; No suspicious lesions per PI-RADS criteria      PSA  3/11/25: 8.96 (PSA density 0.067)  1/2/24: 7.64 (PSA density 0.05)  10/11/23: 7.18  8/21/23: 7.19  7/5/2023: 5.67  3/20/2023: 8.68  8/4/2022: 6.36  3/23/2022: 5.47  8/25/2021: 5.58  1/29/2021: 4.61  7/30/2020: 5.43  8/1/2019: 3.84     MRI prostate, 9/25/2021 ([facility]) 108 mL gland; no focal abnormalities suspicious for clinically significant prostate cancer, prostate enlargement with BPH       Results for orders placed or performed in visit on 03/18/25   Bladder Scan    Collection Time: 03/18/25  8:54 AM   Result Value Ref Range    Urine Volume 0          Review of systems  A review of systems was performed, and positive findings are noted in the HPI.    Objective     Vital Signs:   There were no vitals taken for this visit.      Physical exam  No acute distress, well-nourished  Awake alert and oriented  Mood normal; affect normal  Physical Exam        Bladder Scan interpretation 03/18/2025    Estimation of residual urine via BVI 3000 [place] Bladder Scan  Performed by: LUKE Simons  Residual Urine: 0 ml  Indication: Elevated PSA    Benign prostatic hyperplasia with nocturia   Position: Supine  Examination: Incremental scanning of the suprapubic area using 2.0 MHz transducer using copious amounts of acoustic gel.   Findings: An anechoic area was demonstrated which represented the bladder, with measurement of residual urine as noted. I inspected this myself. In that the residual urine was stable or insignificant, refer to plan for treatment and plan necessary at this time.     Problem List:  Patient Active  Problem List   Diagnosis    Colon cancer screening    Allergic rhinitis    High blood pressure    History of colonic polyps    Hyperlipidemia    Vitamin D deficiency    Elevated prostate specific antigen (PSA)    Prostate nodule    Paroxysmal atrial fibrillation    Cardiomegaly    Long term (current) use of anticoagulants    Metabolic syndrome    Mitral valve disorder    Systolic murmur    Benign prostatic hyperplasia without lower urinary tract symptoms    Onychomycosis    Class 2 obesity due to excess calories with body mass index (BMI) of 35.0 to 35.9 in adult    Lumbar radiculopathy    Pneumonia of both lower lobes due to infectious organism    Other fatigue    Seasonal allergies       Assessment & Plan   Diagnoses and all orders for this visit:    1. Elevated PSA (Primary)  -     PSA DIAGNOSTIC; Future  -     PSA DIAGNOSTIC; Future    2. Benign prostatic hyperplasia with nocturia  -     Bladder Scan  -     doxazosin (Cardura) 8 MG tablet; Take 1 tablet by mouth Every Night for 90 days.  Dispense: 90 tablet; Refill: 0      Emptying bladder without postvoid residual  Assessment & Plan    PSA trend reviewed.  His PSA levels have shown fluctuations over the past 2 years, with a recent increase from 7.39 to 8.9. Despite this, his PSA density remains low at 0.067, which is reassuring given the volume of his prostate.     The MRI of the prostate conducted in March 2024 revealed no suspicious lesions and a prostate size of 132 g, significantly larger than the average of 40 g.     A prescription for a 90-day supply of Cardura 8 mg has been issued, with instructions for him to provide feedback on whether he requires refills, wishes to revert to the lower dose, or prefers an alternative approach involving procedural intervention.     A PSA test has been scheduled for 6 months and another for 1 year.     If the PSA trend continues to rise, a repeat MRI will be considered to ensure no new developments.    The patient will  follow up in 1 year, PVR, PSAs prior     All questions addressed      Patient or patient representative verbalized consent for the use of Ambient Listening during the visit with  Luz Eid MD for chart documentation. 3/18/2025  15:57 EDT

## 2025-07-07 ENCOUNTER — TELEPHONE (OUTPATIENT)
Dept: INTERNAL MEDICINE | Age: 70
End: 2025-07-07
Payer: MEDICARE